# Patient Record
Sex: FEMALE | Race: WHITE | Employment: FULL TIME | URBAN - NONMETROPOLITAN AREA
[De-identification: names, ages, dates, MRNs, and addresses within clinical notes are randomized per-mention and may not be internally consistent; named-entity substitution may affect disease eponyms.]

---

## 2022-12-18 ENCOUNTER — HOSPITAL ENCOUNTER (INPATIENT)
Age: 22
LOS: 3 days | Discharge: HOME | DRG: 343 | End: 2022-12-21
Attending: FAMILY MEDICINE | Admitting: FAMILY MEDICINE
Payer: COMMERCIAL

## 2022-12-18 ENCOUNTER — APPOINTMENT (OUTPATIENT)
Dept: CT IMAGING | Age: 22
DRG: 343 | End: 2022-12-18
Payer: COMMERCIAL

## 2022-12-18 DIAGNOSIS — K35.20 ACUTE APPENDICITIS WITH GENERALIZED PERITONITIS, UNSPECIFIED WHETHER ABSCESS PRESENT, UNSPECIFIED WHETHER GANGRENE PRESENT, UNSPECIFIED WHETHER PERFORATION PRESENT: Primary | ICD-10-CM

## 2022-12-18 DIAGNOSIS — K35.80 ACUTE APPENDICITIS, UNSPECIFIED ACUTE APPENDICITIS TYPE: ICD-10-CM

## 2022-12-18 LAB
ABSOLUTE EOS #: 0.18 K/UL (ref 0–0.44)
ABSOLUTE IMMATURE GRANULOCYTE: 0.18 K/UL (ref 0–0.3)
ABSOLUTE LYMPH #: 2.31 K/UL (ref 1.2–5.2)
ABSOLUTE MONO #: 1.6 K/UL (ref 0.1–1.4)
ALBUMIN SERPL-MCNC: 4.1 G/DL (ref 3.5–5.2)
ALBUMIN/GLOBULIN RATIO: 1.3 (ref 1–2.5)
ALP BLD-CCNC: 67 U/L (ref 35–104)
ALT SERPL-CCNC: 10 U/L (ref 5–33)
ANION GAP SERPL CALCULATED.3IONS-SCNC: 10 MMOL/L (ref 9–17)
AST SERPL-CCNC: 14 U/L
BASOPHILS # BLD: 0 % (ref 0–2)
BASOPHILS ABSOLUTE: 0 K/UL (ref 0–0.2)
BILIRUB SERPL-MCNC: <0.1 MG/DL (ref 0.3–1.2)
BILIRUBIN URINE: NEGATIVE
BILIRUBIN URINE: NEGATIVE
BUN BLDV-MCNC: 14 MG/DL (ref 6–20)
BUN/CREAT BLD: 20 (ref 9–20)
CALCIUM SERPL-MCNC: 9.3 MG/DL (ref 8.6–10.4)
CHLORIDE BLD-SCNC: 102 MMOL/L (ref 98–107)
CO2: 27 MMOL/L (ref 20–31)
COLOR: YELLOW
COLOR: YELLOW
CREAT SERPL-MCNC: 0.7 MG/DL (ref 0.5–0.9)
EOSINOPHILS RELATIVE PERCENT: 1 % (ref 1–4)
EPITHELIAL CELLS UA: ABNORMAL /HPF (ref 0–25)
EPITHELIAL CELLS UA: NORMAL /HPF (ref 0–25)
GFR SERPL CREATININE-BSD FRML MDRD: >60 ML/MIN/1.73M2
GLUCOSE BLD-MCNC: 91 MG/DL (ref 70–99)
GLUCOSE URINE: NEGATIVE
GLUCOSE URINE: NEGATIVE
HCG(URINE) PREGNANCY TEST: NEGATIVE
HCT VFR BLD CALC: 34.5 % (ref 36.3–47.1)
HEMOGLOBIN: 11.6 G/DL (ref 11.9–15.1)
IMMATURE GRANULOCYTES: 1 %
KETONES, URINE: NEGATIVE
KETONES, URINE: NEGATIVE
LEUKOCYTE ESTERASE, URINE: NEGATIVE
LEUKOCYTE ESTERASE, URINE: NEGATIVE
LIPASE: 15 U/L (ref 13–60)
LYMPHOCYTES # BLD: 13 % (ref 25–45)
MCH RBC QN AUTO: 32.9 PG (ref 25.2–33.5)
MCHC RBC AUTO-ENTMCNC: 33.6 G/DL (ref 28.4–34.8)
MCV RBC AUTO: 97.7 FL (ref 82.6–102.9)
MONOCYTES # BLD: 9 % (ref 2–8)
MORPHOLOGY: NORMAL
NITRITE, URINE: NEGATIVE
NITRITE, URINE: NEGATIVE
NRBC AUTOMATED: 0 PER 100 WBC
PDW BLD-RTO: 11.7 % (ref 11.8–14.4)
PH UA: 6.5 (ref 5–9)
PH UA: 6.5 (ref 5–9)
PLATELET # BLD: 268 K/UL (ref 138–453)
PMV BLD AUTO: 9.2 FL (ref 8.1–13.5)
POTASSIUM SERPL-SCNC: 4.2 MMOL/L (ref 3.7–5.3)
PROTEIN UA: ABNORMAL
PROTEIN UA: NEGATIVE
RBC # BLD: 3.53 M/UL (ref 3.95–5.11)
RBC UA: ABNORMAL /HPF (ref 0–2)
RBC UA: NORMAL /HPF (ref 0–2)
SEG NEUTROPHILS: 76 % (ref 34–64)
SEGMENTED NEUTROPHILS ABSOLUTE COUNT: 13.53 K/UL (ref 1.8–8)
SODIUM BLD-SCNC: 139 MMOL/L (ref 135–144)
SPECIFIC GRAVITY UA: >1.03 (ref 1.01–1.02)
SPECIFIC GRAVITY UA: >1.03 (ref 1.01–1.02)
TOTAL PROTEIN: 7.2 G/DL (ref 6.4–8.3)
TURBIDITY: CLEAR
TURBIDITY: CLEAR
URINE HGB: NEGATIVE
URINE HGB: NEGATIVE
UROBILINOGEN, URINE: NORMAL
UROBILINOGEN, URINE: NORMAL
WBC # BLD: 17.8 K/UL (ref 4.5–13.5)
WBC UA: ABNORMAL /HPF (ref 0–5)
WBC UA: NORMAL /HPF (ref 0–5)

## 2022-12-18 PROCEDURE — 2580000003 HC RX 258: Performed by: PHYSICIAN ASSISTANT

## 2022-12-18 PROCEDURE — 81001 URINALYSIS AUTO W/SCOPE: CPT

## 2022-12-18 PROCEDURE — 94761 N-INVAS EAR/PLS OXIMETRY MLT: CPT

## 2022-12-18 PROCEDURE — 1200000000 HC SEMI PRIVATE

## 2022-12-18 PROCEDURE — 36415 COLL VENOUS BLD VENIPUNCTURE: CPT

## 2022-12-18 PROCEDURE — 6360000004 HC RX CONTRAST MEDICATION: Performed by: PHYSICIAN ASSISTANT

## 2022-12-18 PROCEDURE — 80053 COMPREHEN METABOLIC PANEL: CPT

## 2022-12-18 PROCEDURE — 85025 COMPLETE CBC W/AUTO DIFF WBC: CPT

## 2022-12-18 PROCEDURE — 99285 EMERGENCY DEPT VISIT HI MDM: CPT

## 2022-12-18 PROCEDURE — 96365 THER/PROPH/DIAG IV INF INIT: CPT

## 2022-12-18 PROCEDURE — 2580000003 HC RX 258: Performed by: FAMILY MEDICINE

## 2022-12-18 PROCEDURE — 81025 URINE PREGNANCY TEST: CPT

## 2022-12-18 PROCEDURE — 83690 ASSAY OF LIPASE: CPT

## 2022-12-18 PROCEDURE — 74177 CT ABD & PELVIS W/CONTRAST: CPT

## 2022-12-18 PROCEDURE — 6360000002 HC RX W HCPCS: Performed by: PHYSICIAN ASSISTANT

## 2022-12-18 RX ORDER — POTASSIUM CHLORIDE 7.45 MG/ML
10 INJECTION INTRAVENOUS PRN
Status: DISCONTINUED | OUTPATIENT
Start: 2022-12-18 | End: 2022-12-21 | Stop reason: HOSPADM

## 2022-12-18 RX ORDER — SODIUM CHLORIDE 9 MG/ML
INJECTION, SOLUTION INTRAVENOUS PRN
Status: DISCONTINUED | OUTPATIENT
Start: 2022-12-18 | End: 2022-12-21 | Stop reason: HOSPADM

## 2022-12-18 RX ORDER — ONDANSETRON 2 MG/ML
4 INJECTION INTRAMUSCULAR; INTRAVENOUS EVERY 6 HOURS PRN
Status: DISCONTINUED | OUTPATIENT
Start: 2022-12-18 | End: 2022-12-21 | Stop reason: HOSPADM

## 2022-12-18 RX ORDER — ONDANSETRON 4 MG/1
4 TABLET, ORALLY DISINTEGRATING ORAL EVERY 8 HOURS PRN
Status: DISCONTINUED | OUTPATIENT
Start: 2022-12-18 | End: 2022-12-21 | Stop reason: HOSPADM

## 2022-12-18 RX ORDER — SODIUM CHLORIDE, SODIUM LACTATE, POTASSIUM CHLORIDE, CALCIUM CHLORIDE 600; 310; 30; 20 MG/100ML; MG/100ML; MG/100ML; MG/100ML
INJECTION, SOLUTION INTRAVENOUS CONTINUOUS
Status: DISCONTINUED | OUTPATIENT
Start: 2022-12-18 | End: 2022-12-19

## 2022-12-18 RX ORDER — SODIUM CHLORIDE 0.9 % (FLUSH) 0.9 %
5-40 SYRINGE (ML) INJECTION EVERY 12 HOURS SCHEDULED
Status: DISCONTINUED | OUTPATIENT
Start: 2022-12-18 | End: 2022-12-21 | Stop reason: HOSPADM

## 2022-12-18 RX ORDER — POTASSIUM CHLORIDE 20 MEQ/1
40 TABLET, EXTENDED RELEASE ORAL PRN
Status: DISCONTINUED | OUTPATIENT
Start: 2022-12-18 | End: 2022-12-21 | Stop reason: HOSPADM

## 2022-12-18 RX ORDER — MORPHINE SULFATE 2 MG/ML
2 INJECTION, SOLUTION INTRAMUSCULAR; INTRAVENOUS
Status: DISCONTINUED | OUTPATIENT
Start: 2022-12-18 | End: 2022-12-21 | Stop reason: HOSPADM

## 2022-12-18 RX ORDER — SODIUM CHLORIDE 0.9 % (FLUSH) 0.9 %
5-40 SYRINGE (ML) INJECTION PRN
Status: DISCONTINUED | OUTPATIENT
Start: 2022-12-18 | End: 2022-12-21 | Stop reason: HOSPADM

## 2022-12-18 RX ADMIN — SODIUM CHLORIDE, PRESERVATIVE FREE 10 ML: 5 INJECTION INTRAVENOUS at 23:32

## 2022-12-18 RX ADMIN — SODIUM CHLORIDE, POTASSIUM CHLORIDE, SODIUM LACTATE AND CALCIUM CHLORIDE: 600; 310; 30; 20 INJECTION, SOLUTION INTRAVENOUS at 23:28

## 2022-12-18 RX ADMIN — PIPERACILLIN AND TAZOBACTAM 3375 MG: 3; .375 INJECTION, POWDER, FOR SOLUTION INTRAVENOUS at 21:20

## 2022-12-18 RX ADMIN — IOPAMIDOL 75 ML: 755 INJECTION, SOLUTION INTRAVENOUS at 20:26

## 2022-12-18 ASSESSMENT — PAIN SCALES - GENERAL
PAINLEVEL_OUTOF10: 0
PAINLEVEL_OUTOF10: 1
PAINLEVEL_OUTOF10: 5

## 2022-12-18 ASSESSMENT — PAIN DESCRIPTION - ORIENTATION
ORIENTATION: RIGHT;LOWER
ORIENTATION: RIGHT;LOWER

## 2022-12-18 ASSESSMENT — LIFESTYLE VARIABLES
HOW MANY STANDARD DRINKS CONTAINING ALCOHOL DO YOU HAVE ON A TYPICAL DAY: 1 OR 2
HOW OFTEN DO YOU HAVE A DRINK CONTAINING ALCOHOL: NEVER

## 2022-12-18 ASSESSMENT — PAIN DESCRIPTION - LOCATION
LOCATION: ABDOMEN
LOCATION: ABDOMEN

## 2022-12-18 ASSESSMENT — PAIN - FUNCTIONAL ASSESSMENT: PAIN_FUNCTIONAL_ASSESSMENT: 0-10

## 2022-12-19 ENCOUNTER — ANESTHESIA EVENT (OUTPATIENT)
Dept: OPERATING ROOM | Age: 22
DRG: 343 | End: 2022-12-19
Payer: COMMERCIAL

## 2022-12-19 ENCOUNTER — ANESTHESIA (OUTPATIENT)
Dept: OPERATING ROOM | Age: 22
DRG: 343 | End: 2022-12-19
Payer: COMMERCIAL

## 2022-12-19 PROBLEM — E11.9 DIABETES (HCC): Status: ACTIVE | Noted: 2022-12-19

## 2022-12-19 PROBLEM — J45.909 ASTHMA: Status: ACTIVE | Noted: 2022-12-19

## 2022-12-19 LAB
ABSOLUTE EOS #: 0 K/UL (ref 0–0.44)
ABSOLUTE IMMATURE GRANULOCYTE: 0 K/UL (ref 0–0.3)
ABSOLUTE LYMPH #: 2.32 K/UL (ref 1.2–5.2)
ABSOLUTE MONO #: 2.32 K/UL (ref 0.1–1.4)
ANION GAP SERPL CALCULATED.3IONS-SCNC: 9 MMOL/L (ref 9–17)
BASOPHILS # BLD: 0 % (ref 0–2)
BASOPHILS ABSOLUTE: 0 K/UL (ref 0–0.2)
BUN BLDV-MCNC: 12 MG/DL (ref 6–20)
BUN/CREAT BLD: 14 (ref 9–20)
CALCIUM SERPL-MCNC: 9.8 MG/DL (ref 8.6–10.4)
CHLORIDE BLD-SCNC: 101 MMOL/L (ref 98–107)
CO2: 26 MMOL/L (ref 20–31)
CREAT SERPL-MCNC: 0.84 MG/DL (ref 0.5–0.9)
EOSINOPHILS RELATIVE PERCENT: 0 % (ref 1–4)
GFR SERPL CREATININE-BSD FRML MDRD: >60 ML/MIN/1.73M2
GLUCOSE BLD-MCNC: 92 MG/DL (ref 70–99)
HCT VFR BLD CALC: 33.9 % (ref 36.3–47.1)
HEMOGLOBIN: 11.4 G/DL (ref 11.9–15.1)
IMMATURE GRANULOCYTES: 0 %
LYMPHOCYTES # BLD: 12 % (ref 25–45)
MCH RBC QN AUTO: 32.3 PG (ref 25.2–33.5)
MCHC RBC AUTO-ENTMCNC: 33.6 G/DL (ref 28.4–34.8)
MCV RBC AUTO: 96 FL (ref 82.6–102.9)
MONOCYTES # BLD: 12 % (ref 2–8)
MORPHOLOGY: NORMAL
NRBC AUTOMATED: 0 PER 100 WBC
PDW BLD-RTO: 11.7 % (ref 11.8–14.4)
PLATELET # BLD: 257 K/UL (ref 138–453)
PMV BLD AUTO: 9.2 FL (ref 8.1–13.5)
POTASSIUM SERPL-SCNC: 4.6 MMOL/L (ref 3.7–5.3)
RBC # BLD: 3.53 M/UL (ref 3.95–5.11)
SEG NEUTROPHILS: 76 % (ref 34–64)
SEGMENTED NEUTROPHILS ABSOLUTE COUNT: 14.66 K/UL (ref 1.8–8)
SODIUM BLD-SCNC: 136 MMOL/L (ref 135–144)
WBC # BLD: 19.3 K/UL (ref 4.5–13.5)

## 2022-12-19 PROCEDURE — 2580000003 HC RX 258: Performed by: SURGERY

## 2022-12-19 PROCEDURE — 2500000003 HC RX 250 WO HCPCS: Performed by: NURSE ANESTHETIST, CERTIFIED REGISTERED

## 2022-12-19 PROCEDURE — 85025 COMPLETE CBC W/AUTO DIFF WBC: CPT

## 2022-12-19 PROCEDURE — 1200000000 HC SEMI PRIVATE

## 2022-12-19 PROCEDURE — 0WJG4ZZ INSPECTION OF PERITONEAL CAVITY, PERCUTANEOUS ENDOSCOPIC APPROACH: ICD-10-PCS | Performed by: SURGERY

## 2022-12-19 PROCEDURE — A4216 STERILE WATER/SALINE, 10 ML: HCPCS | Performed by: NURSE ANESTHETIST, CERTIFIED REGISTERED

## 2022-12-19 PROCEDURE — 64488 TAP BLOCK BI INJECTION: CPT | Performed by: NURSE ANESTHETIST, CERTIFIED REGISTERED

## 2022-12-19 PROCEDURE — 88304 TISSUE EXAM BY PATHOLOGIST: CPT

## 2022-12-19 PROCEDURE — 2580000003 HC RX 258: Performed by: NURSE ANESTHETIST, CERTIFIED REGISTERED

## 2022-12-19 PROCEDURE — 2720000010 HC SURG SUPPLY STERILE: Performed by: SURGERY

## 2022-12-19 PROCEDURE — 6370000000 HC RX 637 (ALT 250 FOR IP): Performed by: SURGERY

## 2022-12-19 PROCEDURE — 7100000001 HC PACU RECOVERY - ADDTL 15 MIN: Performed by: SURGERY

## 2022-12-19 PROCEDURE — 94761 N-INVAS EAR/PLS OXIMETRY MLT: CPT

## 2022-12-19 PROCEDURE — 7100000000 HC PACU RECOVERY - FIRST 15 MIN: Performed by: SURGERY

## 2022-12-19 PROCEDURE — 3700000000 HC ANESTHESIA ATTENDED CARE: Performed by: SURGERY

## 2022-12-19 PROCEDURE — 3600000004 HC SURGERY LEVEL 4 BASE: Performed by: SURGERY

## 2022-12-19 PROCEDURE — 80048 BASIC METABOLIC PNL TOTAL CA: CPT

## 2022-12-19 PROCEDURE — 6360000002 HC RX W HCPCS: Performed by: FAMILY MEDICINE

## 2022-12-19 PROCEDURE — 2580000003 HC RX 258: Performed by: FAMILY MEDICINE

## 2022-12-19 PROCEDURE — 6360000002 HC RX W HCPCS: Performed by: SURGERY

## 2022-12-19 PROCEDURE — 36415 COLL VENOUS BLD VENIPUNCTURE: CPT

## 2022-12-19 PROCEDURE — 2709999900 HC NON-CHARGEABLE SUPPLY: Performed by: SURGERY

## 2022-12-19 PROCEDURE — 6360000002 HC RX W HCPCS: Performed by: NURSE ANESTHETIST, CERTIFIED REGISTERED

## 2022-12-19 PROCEDURE — 0DTJ0ZZ RESECTION OF APPENDIX, OPEN APPROACH: ICD-10-PCS | Performed by: SURGERY

## 2022-12-19 PROCEDURE — 3700000001 HC ADD 15 MINUTES (ANESTHESIA): Performed by: SURGERY

## 2022-12-19 PROCEDURE — 3600000014 HC SURGERY LEVEL 4 ADDTL 15MIN: Performed by: SURGERY

## 2022-12-19 RX ORDER — DIPHENHYDRAMINE HYDROCHLORIDE 50 MG/ML
INJECTION INTRAMUSCULAR; INTRAVENOUS PRN
Status: DISCONTINUED | OUTPATIENT
Start: 2022-12-19 | End: 2022-12-19 | Stop reason: SDUPTHER

## 2022-12-19 RX ORDER — NEOSTIGMINE METHYLSULFATE 1 MG/ML
INJECTION, SOLUTION INTRAVENOUS PRN
Status: DISCONTINUED | OUTPATIENT
Start: 2022-12-19 | End: 2022-12-19 | Stop reason: SDUPTHER

## 2022-12-19 RX ORDER — SODIUM CHLORIDE 0.9 % (FLUSH) 0.9 %
5-40 SYRINGE (ML) INJECTION EVERY 12 HOURS SCHEDULED
Status: DISCONTINUED | OUTPATIENT
Start: 2022-12-19 | End: 2022-12-19 | Stop reason: HOSPADM

## 2022-12-19 RX ORDER — SODIUM CHLORIDE, SODIUM LACTATE, POTASSIUM CHLORIDE, CALCIUM CHLORIDE 600; 310; 30; 20 MG/100ML; MG/100ML; MG/100ML; MG/100ML
INJECTION, SOLUTION INTRAVENOUS CONTINUOUS
Status: DISCONTINUED | OUTPATIENT
Start: 2022-12-19 | End: 2022-12-21 | Stop reason: HOSPADM

## 2022-12-19 RX ORDER — KETAMINE HCL 50MG/ML(1)
SYRINGE (ML) INTRAVENOUS PRN
Status: DISCONTINUED | OUTPATIENT
Start: 2022-12-19 | End: 2022-12-19 | Stop reason: SDUPTHER

## 2022-12-19 RX ORDER — GLYCOPYRROLATE 0.2 MG/ML
INJECTION INTRAMUSCULAR; INTRAVENOUS PRN
Status: DISCONTINUED | OUTPATIENT
Start: 2022-12-19 | End: 2022-12-19 | Stop reason: SDUPTHER

## 2022-12-19 RX ORDER — FENTANYL CITRATE 50 UG/ML
25 INJECTION, SOLUTION INTRAMUSCULAR; INTRAVENOUS EVERY 5 MIN PRN
Status: COMPLETED | OUTPATIENT
Start: 2022-12-19 | End: 2022-12-19

## 2022-12-19 RX ORDER — SODIUM CHLORIDE 9 MG/ML
INJECTION INTRAVENOUS PRN
Status: DISCONTINUED | OUTPATIENT
Start: 2022-12-19 | End: 2022-12-19 | Stop reason: SDUPTHER

## 2022-12-19 RX ORDER — DEXAMETHASONE SODIUM PHOSPHATE 4 MG/ML
INJECTION, SOLUTION INTRA-ARTICULAR; INTRALESIONAL; INTRAMUSCULAR; INTRAVENOUS; SOFT TISSUE PRN
Status: DISCONTINUED | OUTPATIENT
Start: 2022-12-19 | End: 2022-12-19 | Stop reason: SDUPTHER

## 2022-12-19 RX ORDER — SODIUM CHLORIDE, SODIUM LACTATE, POTASSIUM CHLORIDE, CALCIUM CHLORIDE 600; 310; 30; 20 MG/100ML; MG/100ML; MG/100ML; MG/100ML
INJECTION, SOLUTION INTRAVENOUS CONTINUOUS PRN
Status: DISCONTINUED | OUTPATIENT
Start: 2022-12-19 | End: 2022-12-19 | Stop reason: SDUPTHER

## 2022-12-19 RX ORDER — ROCURONIUM BROMIDE 10 MG/ML
INJECTION, SOLUTION INTRAVENOUS PRN
Status: DISCONTINUED | OUTPATIENT
Start: 2022-12-19 | End: 2022-12-19 | Stop reason: SDUPTHER

## 2022-12-19 RX ORDER — DEXAMETHASONE SODIUM PHOSPHATE 10 MG/ML
INJECTION, SOLUTION INTRAMUSCULAR; INTRAVENOUS PRN
Status: DISCONTINUED | OUTPATIENT
Start: 2022-12-19 | End: 2022-12-19 | Stop reason: SDUPTHER

## 2022-12-19 RX ORDER — FENTANYL CITRATE 50 UG/ML
50 INJECTION, SOLUTION INTRAMUSCULAR; INTRAVENOUS EVERY 5 MIN PRN
Status: DISCONTINUED | OUTPATIENT
Start: 2022-12-19 | End: 2022-12-19 | Stop reason: HOSPADM

## 2022-12-19 RX ORDER — SODIUM CHLORIDE 0.9 % (FLUSH) 0.9 %
5-40 SYRINGE (ML) INJECTION PRN
Status: DISCONTINUED | OUTPATIENT
Start: 2022-12-19 | End: 2022-12-19 | Stop reason: HOSPADM

## 2022-12-19 RX ORDER — METOCLOPRAMIDE HYDROCHLORIDE 5 MG/ML
10 INJECTION INTRAMUSCULAR; INTRAVENOUS
Status: DISCONTINUED | OUTPATIENT
Start: 2022-12-19 | End: 2022-12-19 | Stop reason: HOSPADM

## 2022-12-19 RX ORDER — ONDANSETRON 2 MG/ML
INJECTION INTRAMUSCULAR; INTRAVENOUS PRN
Status: DISCONTINUED | OUTPATIENT
Start: 2022-12-19 | End: 2022-12-19 | Stop reason: SDUPTHER

## 2022-12-19 RX ORDER — PROCHLORPERAZINE EDISYLATE 5 MG/ML
5 INJECTION INTRAMUSCULAR; INTRAVENOUS
Status: DISCONTINUED | OUTPATIENT
Start: 2022-12-19 | End: 2022-12-19 | Stop reason: HOSPADM

## 2022-12-19 RX ORDER — FENTANYL CITRATE 50 UG/ML
INJECTION, SOLUTION INTRAMUSCULAR; INTRAVENOUS PRN
Status: DISCONTINUED | OUTPATIENT
Start: 2022-12-19 | End: 2022-12-19 | Stop reason: SDUPTHER

## 2022-12-19 RX ORDER — LIDOCAINE HYDROCHLORIDE 20 MG/ML
INJECTION, SOLUTION EPIDURAL; INFILTRATION; INTRACAUDAL; PERINEURAL PRN
Status: DISCONTINUED | OUTPATIENT
Start: 2022-12-19 | End: 2022-12-19 | Stop reason: SDUPTHER

## 2022-12-19 RX ORDER — ROPIVACAINE HYDROCHLORIDE 5 MG/ML
INJECTION, SOLUTION EPIDURAL; INFILTRATION; PERINEURAL PRN
Status: DISCONTINUED | OUTPATIENT
Start: 2022-12-19 | End: 2022-12-19 | Stop reason: SDUPTHER

## 2022-12-19 RX ORDER — OXYCODONE HYDROCHLORIDE AND ACETAMINOPHEN 5; 325 MG/1; MG/1
1 TABLET ORAL EVERY 4 HOURS PRN
Status: DISCONTINUED | OUTPATIENT
Start: 2022-12-19 | End: 2022-12-21 | Stop reason: HOSPADM

## 2022-12-19 RX ORDER — SODIUM CHLORIDE 9 MG/ML
INJECTION, SOLUTION INTRAVENOUS PRN
Status: DISCONTINUED | OUTPATIENT
Start: 2022-12-19 | End: 2022-12-19 | Stop reason: HOSPADM

## 2022-12-19 RX ORDER — PROPOFOL 10 MG/ML
INJECTION, EMULSION INTRAVENOUS PRN
Status: DISCONTINUED | OUTPATIENT
Start: 2022-12-19 | End: 2022-12-19 | Stop reason: SDUPTHER

## 2022-12-19 RX ORDER — KETOROLAC TROMETHAMINE 30 MG/ML
INJECTION, SOLUTION INTRAMUSCULAR; INTRAVENOUS PRN
Status: DISCONTINUED | OUTPATIENT
Start: 2022-12-19 | End: 2022-12-19 | Stop reason: SDUPTHER

## 2022-12-19 RX ORDER — MIDAZOLAM HYDROCHLORIDE 1 MG/ML
INJECTION INTRAMUSCULAR; INTRAVENOUS PRN
Status: DISCONTINUED | OUTPATIENT
Start: 2022-12-19 | End: 2022-12-19 | Stop reason: SDUPTHER

## 2022-12-19 RX ADMIN — GLYCOPYRROLATE 0.6 MG: 0.2 INJECTION INTRAMUSCULAR; INTRAVENOUS at 15:18

## 2022-12-19 RX ADMIN — SODIUM CHLORIDE 20 ML: 9 INJECTION INTRAMUSCULAR; INTRAVENOUS; SUBCUTANEOUS at 13:59

## 2022-12-19 RX ADMIN — FENTANYL CITRATE 50 MCG: 50 INJECTION INTRAMUSCULAR; INTRAVENOUS at 13:53

## 2022-12-19 RX ADMIN — SODIUM CHLORIDE, POTASSIUM CHLORIDE, SODIUM LACTATE AND CALCIUM CHLORIDE: 600; 310; 30; 20 INJECTION, SOLUTION INTRAVENOUS at 17:59

## 2022-12-19 RX ADMIN — FENTANYL CITRATE 50 MCG: 50 INJECTION INTRAMUSCULAR; INTRAVENOUS at 13:48

## 2022-12-19 RX ADMIN — KETOROLAC TROMETHAMINE 30 MG: 30 INJECTION, SOLUTION INTRAMUSCULAR; INTRAVENOUS at 15:17

## 2022-12-19 RX ADMIN — FENTANYL CITRATE 50 MCG: 50 INJECTION, SOLUTION INTRAMUSCULAR; INTRAVENOUS at 16:29

## 2022-12-19 RX ADMIN — GLYCOPYRROLATE 0.2 MG: 0.2 INJECTION INTRAMUSCULAR; INTRAVENOUS at 16:10

## 2022-12-19 RX ADMIN — OXYCODONE HYDROCHLORIDE AND ACETAMINOPHEN 1 TABLET: 5; 325 TABLET ORAL at 17:15

## 2022-12-19 RX ADMIN — LIDOCAINE HYDROCHLORIDE 5 ML: 20 INJECTION, SOLUTION EPIDURAL; INFILTRATION; INTRACAUDAL; PERINEURAL at 13:54

## 2022-12-19 RX ADMIN — FENTANYL CITRATE 25 MCG: 50 INJECTION, SOLUTION INTRAMUSCULAR; INTRAVENOUS at 16:16

## 2022-12-19 RX ADMIN — PIPERACILLIN AND TAZOBACTAM 3375 MG: 3; .375 INJECTION, POWDER, FOR SOLUTION INTRAVENOUS at 10:52

## 2022-12-19 RX ADMIN — DIPHENHYDRAMINE HYDROCHLORIDE 12.5 MG: 50 INJECTION, SOLUTION INTRAMUSCULAR; INTRAVENOUS at 14:34

## 2022-12-19 RX ADMIN — SODIUM CHLORIDE, POTASSIUM CHLORIDE, SODIUM LACTATE AND CALCIUM CHLORIDE: 600; 310; 30; 20 INJECTION, SOLUTION INTRAVENOUS at 14:37

## 2022-12-19 RX ADMIN — PIPERACILLIN AND TAZOBACTAM 3375 MG: 3; .375 INJECTION, POWDER, FOR SOLUTION INTRAVENOUS at 03:36

## 2022-12-19 RX ADMIN — ONDANSETRON 4 MG: 2 INJECTION INTRAMUSCULAR; INTRAVENOUS at 14:02

## 2022-12-19 RX ADMIN — FENTANYL CITRATE 25 MCG: 50 INJECTION, SOLUTION INTRAMUSCULAR; INTRAVENOUS at 16:09

## 2022-12-19 RX ADMIN — PROPOFOL 50 MG: 10 INJECTION, EMULSION INTRAVENOUS at 14:31

## 2022-12-19 RX ADMIN — SODIUM CHLORIDE, POTASSIUM CHLORIDE, SODIUM LACTATE AND CALCIUM CHLORIDE: 600; 310; 30; 20 INJECTION, SOLUTION INTRAVENOUS at 13:31

## 2022-12-19 RX ADMIN — Medication 25 MG: at 14:52

## 2022-12-19 RX ADMIN — CEFAZOLIN 2 G: 10 INJECTION, POWDER, FOR SOLUTION INTRAVENOUS at 14:28

## 2022-12-19 RX ADMIN — ROCURONIUM BROMIDE 20 MG: 10 INJECTION, SOLUTION INTRAVENOUS at 14:31

## 2022-12-19 RX ADMIN — MIDAZOLAM 2 MG: 1 INJECTION INTRAMUSCULAR; INTRAVENOUS at 13:46

## 2022-12-19 RX ADMIN — MORPHINE SULFATE 2 MG: 2 INJECTION, SOLUTION INTRAMUSCULAR; INTRAVENOUS at 18:58

## 2022-12-19 RX ADMIN — DEXAMETHASONE SODIUM PHOSPHATE 4 MG: 4 INJECTION, SOLUTION INTRAMUSCULAR; INTRAVENOUS at 14:02

## 2022-12-19 RX ADMIN — ROPIVACAINE HYDROCHLORIDE 30 ML: 5 INJECTION EPIDURAL; INFILTRATION; PERINEURAL at 13:59

## 2022-12-19 RX ADMIN — PROPOFOL 150 MG: 10 INJECTION, EMULSION INTRAVENOUS at 13:54

## 2022-12-19 RX ADMIN — PIPERACILLIN AND TAZOBACTAM 3375 MG: 3; .375 INJECTION, POWDER, FOR SOLUTION INTRAVENOUS at 20:02

## 2022-12-19 RX ADMIN — NEOSTIGMINE METHYLSULFATE 3 MG: 1 INJECTION, SOLUTION INTRAVENOUS at 15:18

## 2022-12-19 RX ADMIN — ROCURONIUM BROMIDE 30 MG: 10 INJECTION, SOLUTION INTRAVENOUS at 13:54

## 2022-12-19 RX ADMIN — Medication 25 MG: at 15:11

## 2022-12-19 RX ADMIN — ONDANSETRON 4 MG: 2 INJECTION INTRAMUSCULAR; INTRAVENOUS at 17:55

## 2022-12-19 RX ADMIN — DEXAMETHASONE SODIUM PHOSPHATE 10 MG: 10 INJECTION, SOLUTION INTRAMUSCULAR; INTRAVENOUS at 13:59

## 2022-12-19 RX ADMIN — ONDANSETRON 4 MG: 2 INJECTION INTRAMUSCULAR; INTRAVENOUS at 15:17

## 2022-12-19 ASSESSMENT — PAIN - FUNCTIONAL ASSESSMENT
PAIN_FUNCTIONAL_ASSESSMENT: ACTIVITIES ARE NOT PREVENTED
PAIN_FUNCTIONAL_ASSESSMENT: 0-10
PAIN_FUNCTIONAL_ASSESSMENT: PREVENTS OR INTERFERES SOME ACTIVE ACTIVITIES AND ADLS

## 2022-12-19 ASSESSMENT — PAIN DESCRIPTION - ORIENTATION
ORIENTATION: RIGHT;LEFT
ORIENTATION: RIGHT;LOWER
ORIENTATION: RIGHT;LEFT
ORIENTATION: RIGHT;LOWER
ORIENTATION: RIGHT;LEFT

## 2022-12-19 ASSESSMENT — PAIN DESCRIPTION - LOCATION
LOCATION: ABDOMEN

## 2022-12-19 ASSESSMENT — PAIN SCALES - GENERAL
PAINLEVEL_OUTOF10: 7
PAINLEVEL_OUTOF10: 5
PAINLEVEL_OUTOF10: 7
PAINLEVEL_OUTOF10: 7
PAINLEVEL_OUTOF10: 9
PAINLEVEL_OUTOF10: 5
PAINLEVEL_OUTOF10: 7
PAINLEVEL_OUTOF10: 7
PAINLEVEL_OUTOF10: 4
PAINLEVEL_OUTOF10: 4

## 2022-12-19 ASSESSMENT — PAIN DESCRIPTION - PAIN TYPE
TYPE: SURGICAL PAIN

## 2022-12-19 ASSESSMENT — PAIN DESCRIPTION - DESCRIPTORS
DESCRIPTORS: ACHING;DISCOMFORT

## 2022-12-19 ASSESSMENT — PAIN DESCRIPTION - FREQUENCY
FREQUENCY: INTERMITTENT

## 2022-12-19 ASSESSMENT — PAIN DESCRIPTION - ONSET
ONSET: ON-GOING

## 2022-12-19 NOTE — ANESTHESIA POSTPROCEDURE EVALUATION
Department of Anesthesiology  Postprocedure Note    Patient: Nisreen Delgado  MRN: [id number]  YOB: 2000  Date of evaluation: 12/19/2022      Procedure Summary     Date: 12/19/22 Room / Location: [address] 04 / [facility]    Anesthesia Start: 8158 Anesthesia Stop: 1948    Procedure: APPENDECTOMY LAPAROSCOPIC - CONVERTED TO OPEN Diagnosis:       Acute appendicitis, unspecified acute appendicitis type      (acute appy)    Surgeons: Mari Catalan MD Responsible Provider: MARK Bailey CRNA    Anesthesia Type: general ASA Status: 1          Anesthesia Type: No value filed.     Jacob Phase I: Jacob Score: 9    Jacob Phase II:        Anesthesia Post Evaluation    Patient location during evaluation: PACU  Patient participation: complete - patient participated  Level of consciousness: awake and alert  Pain score: 4  Airway patency: patent  Nausea & Vomiting: no nausea and no vomiting  Complications: no  Cardiovascular status: blood pressure returned to baseline  Respiratory status: acceptable and room air  Hydration status: stable  Multimodal analgesia pain management approach

## 2022-12-19 NOTE — PROGRESS NOTES
Patient was admitted to the floor and report was taken. Vitals, assessment, and navigator are complete. Writer walked patient through the doctor's orders and the medications that would be administered tonight. Medications given in ER were also reviewed. Writer encouraged patient to ask questions. Personal belongings are with patient and patient is aware that she may have to remove her piercings for surgery. Patient's sister will hang on to them while she's in surgery. Patient and family were oriented to the room and call light and is aware that the IV pole has to go with patient if she has to use the restroom. Patient knows to call out if she has any difficulty with it. Patient's sister will remain in room overnight. Call light and bedside table are within reach. Patient aware surgery will be some time in the afternoon and that she will have to be NPO. Writer explained how the patient's day would possibly go tomorrow and that a care team would be rounding some time in the morning. No further questions from patient for family.

## 2022-12-19 NOTE — ANESTHESIA PROCEDURE NOTES
Peripheral Block    Patient location during procedure: pre-op  Reason for block: post-op pain management and at surgeon's request  Start time: 12/19/2022 1:53 PM  End time: 12/19/2022 1:58 PM  Staffing  Performed: resident/CRNA   Resident/CRNA: MARK Aguero CRNA  Preanesthetic Checklist  Completed: patient identified, IV checked, site marked, risks and benefits discussed, surgical/procedural consents, equipment checked, pre-op evaluation, timeout performed, anesthesia consent given, oxygen available, monitors applied/VS acknowledged and fire risk safety assessment completed and verbalized  Peripheral Block   Patient position: supine  Prep: ChloraPrep  Provider prep: mask and sterile gloves  Patient monitoring: continuous pulse ox, frequent blood pressure checks and IV access  Block type: TAP and Rectus sheath  Laterality: bilateral  Injection technique: single-shot  Guidance: ultrasound guided  Local infiltration: ropivacaine and decadron (15 ml ropivacaine left tap 7.5 ml ropivacaine 5 ml PFNS bilat RS decadron divieded even throughout)  Infiltration strength: 0.5 %  Local infiltration: ropivacaine and decadron (15 ml ropivacaine left tap 7.5 ml ropivacaine 5 ml PFNS bilat RS decadron divieded even throughout)  Dose: 30 mL    Needle   Needle type:  Other   Needle gauge: 22 G  Needle localization: anatomical landmarks and ultrasound guidanceOther needle type: [place]  Assessment   Injection assessment: negative aspiration for heme, no paresthesia on injection and no intravascular symptoms  Paresthesia pain: none  Slow fractionated injection: yes  Hemodynamics: stable  Real-time US image taken/store: yes  Outcomes: uncomplicated and patient tolerated procedure well

## 2022-12-19 NOTE — ANESTHESIA PRE PROCEDURE
Department of Anesthesiology  Preprocedure Note       Name:  Thad Green   Age:  23 y.o.  :  2000                                          MRN:  [id number]         Date:  2022      Surgeon: Francis Viveros):  Rios Mendenhall MD    Procedure: Procedure(s):  APPENDECTOMY LAPAROSCOPIC    Medications prior to admission:   Prior to Admission medications    Not on File       Current medications:    Current Facility-Administered Medications   Medication Dose Route Frequency Provider Last Rate Last Admin    [facility] Hold] lactated ringers infusion   IntraVENous Continuous Jaimie Verma  mL/hr at 22 2328 New Bag at 22 2328    [MAR Hold] sodium chloride flush 0.9 % injection 5-40 mL  5-40 mL IntraVENous 2 times per day Jaimie Verma MD   10 mL at 22 2332    [MAR Hold] sodium chloride flush 0.9 % injection 5-40 mL  5-40 mL IntraVENous PRN Jaimie Verma MD        [facility] Hold] 0.9 % sodium chloride infusion   IntraVENous PRN Jaimie Verma MD        [facility] Hold] potassium chloride (KLOR-CON M) extended release tablet 40 mEq  40 mEq Oral PRN Jaimie Verma MD        Or   [facility]] potassium bicarb-citric acid (EFFER-K) effervescent tablet 40 mEq  40 mEq Oral PRN Jaimie Verma MD        Or   [facility] Hold] potassium chloride 10 mEq/100 mL IVPB (Peripheral Line)  10 mEq IntraVENous PRN Jaimie Verma MD        [facility] Hold] ondansetron (ZOFRAN-ODT) disintegrating tablet 4 mg  4 mg Oral Q8H PRN Jaimie Verma MD        Or   [facility] Hold] ondansetron (ZOFRAN) injection 4 mg  4 mg IntraVENous Q6H PRN Jaimie Verma MD        [facility] Hold] piperacillin-tazobactam (ZOSYN) 3,375 mg in dextrose 5 % 50 mL IVPB extended infusion (mini-bag)  3,375 mg IntraVENous Q8H Jaimie Verma MD 12.5 mL/hr at 22 1052 3,375 mg at 22 1052    [MAR Hold] morphine (PF) injection 2 mg  2 mg IntraVENous Q2H PRN Jaimie Verma MD           Allergies:  No Known Allergies    Problem List:    Patient Active Problem List   Diagnosis Code    Acute appendicitis K35.80    Asthma J45.909    [facility] E11.9       Past Medical History:  History reviewed. No pertinent past medical history. Past Surgical History:  History reviewed. No pertinent surgical history.     Social History:    Social History     Tobacco Use    Smoking status: Never    Smokeless tobacco: Never   Substance Use Topics    Alcohol use: Yes     Comment: very rarely                                Counseling given: Not Answered      Vital Signs (Current):   Vitals:    12/19/22 0430 12/19/22 0700 12/19/22 1145 12/19/22 1247   BP:  110/60 103/64 (!) 112/59   Pulse:  75 86 79   Resp:  14 16 23   Temp:  36.9 °C (98.4 °F) 37.6 °C (99.7 °F) 37.3 °C (99.1 °F)   TempSrc:  Temporal Temporal Temporal   SpO2:  100% 100% 99%   Weight: 132 lb (59.9 kg)                                                 BP Readings from Last 3 Encounters:   12/19/22 (!) 112/59       NPO Status: Time of last liquid consumption: 2330                        Time of last solid consumption: 1300                        Date of last liquid consumption: 12/18/22                        Date of last solid food consumption: 12/18/22    BMI:   Wt Readings from Last 3 Encounters:   12/19/22 132 lb (59.9 kg)     There is no height or weight on file to calculate BMI.    CBC:   Lab Results   Component Value Date/Time    WBC 19.3 12/19/2022 06:10 AM    RBC 3.53 12/19/2022 06:10 AM    HGB 11.4 12/19/2022 06:10 AM    HCT 33.9 12/19/2022 06:10 AM    MCV 96.0 12/19/2022 06:10 AM    RDW 11.7 12/19/2022 06:10 AM     12/19/2022 06:10 AM       CMP:   Lab Results   Component Value Date/Time     12/19/2022 06:10 AM    K 4.6 12/19/2022 06:10 AM     12/19/2022 06:10 AM    CO2 26 12/19/2022 06:10 AM    BUN 12 12/19/2022 06:10 AM    CREATININE 0.84 12/19/2022 06:10 AM    LABGLOM >60 12/19/2022 06:10 AM    GLUCOSE 92 12/19/2022 06:10 AM    PROT 7.2 12/18/2022 08:25 PM    CALCIUM 9.8 12/19/2022 06:10 AM    BILITOT <0.1 12/18/2022 08:25 PM    ALKPHOS 67 12/18/2022 08:25 PM    AST 14 12/18/2022 08:25 PM    ALT 10 12/18/2022 08:25 PM       POC Tests: No results for input(s): POCGLU, POCNA, POCK, POCCL, POCBUN, POCHEMO, POCHCT in the last 72 hours. Coags: No results found for: PROTIME, INR, APTT    HCG (If Applicable):   Lab Results   Component Value Date    PREGTESTUR NEGATIVE 12/18/2022        ABGs: No results found for: PHART, PO2ART, RZE2SKA, DKJ2ZPF, BEART, Q4SYVVHX     Type & Screen (If Applicable):  No results found for: LABABO, LABRH    Drug/Infectious Status (If Applicable):  No results found for: HIV, HEPCAB    COVID-19 Screening (If Applicable): No results found for: COVID19        Anesthesia Evaluation  Patient summary reviewed and Nursing notes reviewed no history of anesthetic complications:   Airway: Mallampati: I  TM distance: >3 FB   Neck ROM: full  Mouth opening: > = 3 FB   Dental:          Pulmonary:Negative Pulmonary ROS and normal exam                               Cardiovascular:Negative CV ROS  Exercise tolerance: good (>4 METS),       (-) CAD, CABG/stent and dysrhythmias       Beta Blocker:  Not on Beta Blocker         Neuro/Psych:   Negative Neuro/Psych ROS              GI/Hepatic/Renal: Neg GI/Hepatic/Renal ROS            Endo/Other: Negative Endo/Other ROS                     ROS comment: PT DENIES dm Abdominal:             Vascular: negative vascular ROS. Other Findings:           Anesthesia Plan      general     ASA 1       Induction: intravenous. MIPS: Prophylactic antiemetics administered. Anesthetic plan and risks discussed with patient. Use of blood products discussed with patient whom consented to blood products. Plan discussed with CRNA.                     MARK Smith CRNA   12/19/2022

## 2022-12-19 NOTE — H&P
Subjective:      Patient ID: Wilver Valdez is a 23 y.o. female who presents today for:  Chief Complaint   Patient presents with    Abdominal Pain     Right lower quadrant pain for past 2 days     HPI  Kristina presented last evening with 24 to 48-hour history of lower abdominal pain. The pain has settled into the right lower quadrant. The pain is associated with nausea but no vomiting. The patient has had no fever. She denies any change in bowel habits. She denies any urinary symptoms. She denies previous history of similar type symptoms. History reviewed. No pertinent past medical history. History reviewed. No pertinent surgical history. No Known Allergies    No current facility-administered medications on file prior to encounter. No current outpatient medications on file prior to encounter. Objective:   Physical Examination:    BP (!) 112/59   Pulse 79   Temp 99.1 °F (37.3 °C) (Temporal)   Resp 23   Wt 132 lb (59.9 kg)   LMP 11/18/2022 (Approximate)   SpO2 99%     Physical Exam  HENT:      Head: Normocephalic and atraumatic. Eyes:      Extraocular Movements: Extraocular movements intact. Pupils: Pupils are equal, round, and reactive to light. Cardiovascular:      Rate and Rhythm: Normal rate and regular rhythm. Pulses: Normal pulses. Heart sounds: Normal heart sounds. No murmur heard. No friction rub. No gallop. Pulmonary:      Effort: Pulmonary effort is normal.      Breath sounds: Normal breath sounds. Abdominal:      General: Abdomen is flat. Bowel sounds are normal.      Palpations: Abdomen is soft. Tenderness: There is abdominal tenderness (RLQ without obvious mass). Musculoskeletal:         General: No swelling. Normal range of motion. Cervical back: Normal range of motion and neck supple. Neurological:      General: No focal deficit present. Mental Status: She is oriented to person, place, and time.    Psychiatric:         Mood and

## 2022-12-19 NOTE — ED PROVIDER NOTES
[facility], [facility] AT THE [facility] MED SURG  eMERGENCY dEPARTMENT eNCOUnter      Pt Name: Wilver Valdez  MRN: [id number]  [place] 2000  Date of evaluation: 12/18/22      CHIEF COMPLAINT:  Chief Complaint   Patient presents with    Abdominal Pain     Right lower quadrant pain for past 2 days       HISTORY OF PRESENT ILLNESS    Wilver Valdez is a 23 y.o. female who presents with right lower quadrant abdominal pain that is been going on for the past 2 days. She denies any fevers but states that she did feel chills at home last night. She denies any vomiting but states that she does feel nauseous. Denies any diarrhea or constipation. Denies any dysuria. States that the pain has localized in the right lower quadrant without any radiation. REVIEW OF SYSTEMS       Constitutional: Denies recent fever  Eyes: No visual changes. Neck: No neck pain. Respiratory: Denies recent shortness of breath. Cardiac:  Denies recent chest pain. GI: Right lower quadrant abdominal pain    Denies Blood in the stool or black tarry stools. : denies dysuria. Musculoskeletal: Denies focal weakness. Neurologic: denies headache or focal weakness. Skin:  Denies any rash. Negative in 10 essential Systems except as mentioned above and in the HPI. PAST MEDICAL HISTORY   PMH:  has no past medical history on file. Surgical History:  has a past surgical history that includes laparoscopic appendectomy (N/A, 12/19/2022). Social History:  reports that she has never smoked. She has never used smokeless tobacco. She reports current alcohol use. Family History: Noncontributory at this time  Psychiatric History: Noncontributory at this time    Allergies:has No Known Allergies.       PHYSICAL EXAM     INITIAL VITALS: /64   Pulse 63   Temp 98.4 °F (36.9 °C) (Temporal)   Resp 16   Ht 5' 7\" (1.702 m)   Wt 131 lb 9.8 oz (59.7 kg)   LMP 11/18/2022 (Approximate)   SpO2 98%   BMI 20.61 kg/m²   Constitutional:  Well developed Eyes:  Pupils equal and readily reactive to light  HENT:  Atraumatic, external ears normal, nose normal, oropharynx moist. Neck- supple   Respiratory:  Clear to auscultation bilaterally with good air exchange  Cardiovascular:  RRR with normal S1 and S2  Gastrointestinal/Abdomen: There is tenderness to palpation to McBurney's point, no peritoneal signs, bowel sounds active all quadrants  No pulsatile masses palpated. Musculoskeletal:  No edema, no tenderness, no deformities. Back:  No CVA tenderness. Normal to inspection. Integument:  No rash. Neurologic:  Alert & oriented x 3, no focal deficits noted       DIAGNOSTIC RESULTS       RADIOLOGY:   All plain film, CT, MRI, and formal ultrasound images (except ED bedside ultrasound) are read by the radiologist   CT ABDOMEN PELVIS W IV CONTRAST Additional Contrast? None   Final Result   Acute appendicitis with adjacent phlegmonous type inflammatory changes in the   right lower quadrant. There is no drainable fluid collection/abscess. Small volume of free fluid in the pelvis, likely reactive. Critical results were called by Dr. Iliana Stuart. Kisha Navarrete MD to Marielle Fast on   12/18/2022 at 21:05. LABS:  Labs Reviewed   URINALYSIS WITH MICROSCOPIC - Abnormal; Notable for the following components:       Result Value    Specific Gravity, UA >1.030 (*)     Protein, UA TRACE (*)     All other components within normal limits   CBC WITH AUTO DIFFERENTIAL - Abnormal; Notable for the following components:    WBC 17.8 (*)     RBC 3.53 (*)     Hemoglobin 11.6 (*)     Hematocrit 34.5 (*)     RDW 11.7 (*)     Seg Neutrophils 76 (*)     Lymphocytes 13 (*)     Monocytes 9 (*)     Immature Granulocytes 1 (*)     Segs Absolute 13.53 (*)     Absolute Mono # 1.60 (*)     All other components within normal limits   COMPREHENSIVE METABOLIC PANEL - Abnormal; Notable for the following components:     Total Bilirubin <0.1 (*)     All other components within normal limits   URINALYSIS WITH REFLEX TO CULTURE - Abnormal; Notable for the following components:    Specific Gravity, UA >1.030 (*)     All other components within normal limits   CBC WITH AUTO DIFFERENTIAL - Abnormal; Notable for the following components:    WBC 19.3 (*)     RBC 3.53 (*)     Hemoglobin 11.4 (*)     Hematocrit 33.9 (*)     RDW 11.7 (*)     Seg Neutrophils 76 (*)     Lymphocytes 12 (*)     Monocytes 12 (*)     Eosinophils % 0 (*)     Segs Absolute 14.66 (*)     Absolute Mono # 2.32 (*)     All other components within normal limits   CBC WITH AUTO DIFFERENTIAL - Abnormal; Notable for the following components:    WBC 24.2 (*)     RBC 3.35 (*)     Hemoglobin 11.0 (*)     Hematocrit 32.2 (*)     RDW 11.4 (*)     Seg Neutrophils 89 (*)     Lymphocytes 5 (*)     Eosinophils % 0 (*)     Segs Absolute 21.54 (*)     Absolute Mono # 1.45 (*)     All other components within normal limits   BASIC METABOLIC PANEL W/ REFLEX TO MG FOR LOW K - Abnormal; Notable for the following components:    Glucose 133 (*)     All other components within normal limits   CBC WITH AUTO DIFFERENTIAL - Abnormal; Notable for the following components:    RBC 3.12 (*)     Hemoglobin 10.3 (*)     Hematocrit 30.9 (*)     RDW 11.7 (*)     Seg Neutrophils 70 (*)     Lymphocytes 19 (*)     Monocytes 10 (*)     Segs Absolute 8.39 (*)     All other components within normal limits   BASIC METABOLIC PANEL W/ REFLEX TO MG FOR LOW K - Abnormal; Notable for the following components:    Anion Gap 7 (*)     All other components within normal limits   PREGNANCY, URINE   LIPASE   MICROSCOPIC URINALYSIS   BASIC METABOLIC PANEL W/ REFLEX TO MG FOR LOW K   SURGICAL PATHOLOGY REPORT   SURGICAL PATHOLOGY         EMERGENCY DEPARTMENT COURSE:   -------------------------  Signout dr. Sushila Triplett.  Ct and labs pending        Orders Placed This Encounter   Medications    iopamidol (ISOVUE-370) 76 % injection 75 mL    piperacillin-tazobactam (ZOSYN) 3,375 mg in dextrose 5 % 50 mL IVPB (mini-bag)     Order Specific Question:   Antimicrobial Indications     Answer:   Surgical Prophylaxis    DISCONTD: lactated ringers infusion    DISCONTD: sodium chloride flush 0.9 % injection 5-40 mL    DISCONTD: sodium chloride flush 0.9 % injection 5-40 mL    DISCONTD: 0.9 % sodium chloride infusion    DISCONTD: potassium chloride (KLOR-CON M) extended release tablet 40 mEq    DISCONTD: potassium bicarb-citric acid (EFFER-K) effervescent tablet 40 mEq    DISCONTD: potassium chloride 10 mEq/100 mL IVPB (Peripheral Line)    DISCONTD: ondansetron (ZOFRAN-ODT) disintegrating tablet 4 mg    DISCONTD: ondansetron (ZOFRAN) injection 4 mg    DISCONTD: piperacillin-tazobactam (ZOSYN) 3,375 mg in dextrose 5 % 50 mL IVPB extended infusion (mini-bag)     Order Specific Question:   Antimicrobial Indications     Answer:   Intra-Abdominal Infection    DISCONTD: morphine (PF) injection 2 mg    DISCONTD: sodium chloride flush 0.9 % injection 5-40 mL    DISCONTD: sodium chloride flush 0.9 % injection 5-40 mL    DISCONTD: 0.9 % sodium chloride infusion    fentaNYL (SUBLIMAZE) injection 25 mcg    DISCONTD: fentaNYL (SUBLIMAZE) injection 50 mcg    DISCONTD: metoclopramide (REGLAN) injection 10 mg    DISCONTD: prochlorperazine (COMPAZINE) injection 5 mg    DISCONTD: lactated ringers infusion    DISCONTD: oxyCODONE-acetaminophen (PERCOCET) 5-325 MG per tablet 1 tablet    DISCONTD: enoxaparin Sodium (LOVENOX) injection 30 mg     Order Specific Question:   Indication of Use     Answer:   Prophylaxis-DVT/PE    DISCONTD: enoxaparin (LOVENOX) injection 40 mg     Order Specific Question:   Indication of Use     Answer:   Prophylaxis-DVT/PE    oxyCODONE-acetaminophen (PERCOCET) 5-325 MG per tablet     Sig: Take 1 tablet by mouth every 4 hours as needed for Pain for up to 5 days.  Max Daily Amount: 6 tablets     Dispense:  20 tablet     Refill:  0     Reduce doses taken as pain becomes manageable    ondansetron (ZOFRAN) 4 MG tablet     Sig: Take 1 tablet by mouth 3 times daily as needed for Nausea or Vomiting     Dispense:  15 tablet     Refill:  0    amoxicillin-clavulanate (AUGMENTIN) 875-125 MG per tablet     Sig: Take 1 tablet by mouth 2 times daily for 10 days     Dispense:  20 tablet     Refill:  0       PATIENT REFERRED TO:  Kian Chandra MD  [address] [facility] [address]  [phone]    Go on 12/28/2022  follow up visit @ 9 AM    DISCHARGE MEDICATIONS:  Discharge Medication List as of 12/21/2022 12:40 PM        START taking these medications    Details   oxyCODONE-acetaminophen (PERCOCET) 5-325 MG per tablet Take 1 tablet by mouth every 4 hours as needed for Pain for up to 5 days.  Max Daily Amount: 6 tablets, Disp-20 tablet, R-0Print      ondansetron (ZOFRAN) 4 MG tablet Take 1 tablet by mouth 3 times daily as needed for Nausea or Vomiting, Disp-15 tablet, R-0Print      amoxicillin-clavulanate (AUGMENTIN) 875-125 MG per tablet Take 1 tablet by mouth 2 times daily for 10 days, Disp-20 tablet, R-0Print             (Please note that portions of this note were completed with a voice recognition program.  Efforts were made to edit the dictations but occasionally words are mis-transcribed.)    ERLIN Khan PA-C  12/18/22 2044       Jose Antonio Ruano PA-C  12/23/22 1121

## 2022-12-19 NOTE — OP NOTE
OPERATIVE NOTE    DATE OF PROCEDURE: 12/19/2022     SURGEON: Scout Siu MD     ASSISTANT: Leni Rascon CNP    PREOPERATIVE DIAGNOSIS: Acute appendicitis    POSTOPERATIVE DIAGNOSIS: Acute suppurative appendicitis    OPERATION: #1 attempted laparoscopic appendectomy #2 open appendectomy    ANESTHESIA: General    ESTIMATED BLOOD LOSS: 50 cc    COMPLICATIONS: None     SPECIMENS:   ID Type Source Tests Collected by Time Destination   A :  Tissue Appendix SURGICAL PATHOLOGY Scout Siu MD 12/19/2022 1505         HISTORY: The patient is a 23y.o. year old female with history of above preop diagnosis. I explained the risk, benefits, expected outcome, and alternatives to the procedure. Patient understands and is in agreement. PROCEDURE: The patient is brought to the operating room. She is placed in supine position. Inhalation anesthesia is induced. The abdomen is prepped draped standard fashion. Supraumbilical midline incision is made with a scalpel blade. Access is gained to the peritoneal cavity in standard fashion. Brandi Starcher port is placed and peritoneal insufflation is obtained. 2 5 mm ports were placed in standard locations in the left lower abdomen under direct vision. The patient is placed headdown right side up the appendix is located and grasped. The appendix is retrocecal.  I used a Kayy clamp to grasp the cecum and began to attempt to free up the appendix and delivered up into the anterior portion of the peritoneal cavity. An abscess cavity is drained as this is attempted. The appendix was quite adherent to the retroperitoneum. I was not having much success in mobilizing the appendix and the appendix was starting to rupture. I felt at that point that it was best to abort the laparoscopic approach. Air is released from the peritoneal cavity. All ports were removed. The fascia at the supraumbilical port site was reapproximated using interrupted 0 Vicryl suture.   McBurney incision is made with a #15 scalpel blade. Access is gained to the peritoneal cavity in standard fashion. The cecum and appendix were then delivered up into the wound. As the appendix was mobilized I did secure the appendiceal artery with a hemostat clamp and this was ligated using 3-0 Vicryl suture. There is a small rupture in the appendix near its tip but otherwise the appendix appears to be intact. A 3-0 silk pursestring suture is placed around the cecum at the base of the appendix. The appendix is doubly ligated using 3-0 Vicryl suture. The appendix is divided with a scalpel blade distal to these ligatures. The appendiceal stump was then done through the pursestring suture which is tied securely. The peritoneal cavity is vigorously flushed with saline which is then aspirated. Hemostasis is confirmed. The transversalis fascia is closed using running 3-0 Vicryl suture. The external bleak aponeurosis was closed using running 2-0 Vicryl suture. The skin incisions were closed using 4-0 Monocryl suture. Dressings are applied. The patient is awakened and returned to the recovery room. The patient was sent to PACU in good condition.      Electronically signed by Jorge Davis MD on 12/19/22 at 3:29 PM EST

## 2022-12-19 NOTE — PROGRESS NOTES
Pt vitals and assessment completed at this time, see flowsheet. Pt A&Ox4, breathing normal. Pt c/o pain 4/10 in RLQ. Pt denies any further needs at this time, call light within reach, will continue to monitor.

## 2022-12-19 NOTE — H&P
[facility]  History and Physical        Patient:  Carmelita Reddy  MRN: [id number]    Chief Complaint:    Chief Complaint   Patient presents with    Abdominal Pain     Right lower quadrant pain for past 2 days       History Obtained From:  patient, electronic medical record  PCP: No primary care provider on file. History of Present Illness: The patient is a 23 y.o. female who presents with abdominal pain associated with nausea ,starting 2 days ago. She came to er and has acute appendicitis. DR Florentino Bourgeois contacted and advised admission for surgery,iv antibiotics pending surgery. Past Medical History:    History reviewed. No pertinent past medical history. Past Surgical History:        Procedure Laterality Date    LAPAROSCOPIC APPENDECTOMY N/A 12/19/2022    APPENDECTOMY LAPAROSCOPIC - CONVERTED TO OPEN performed by Aura Villalobos MD at [address]       Medications Prior to Admission:    Prior to Admission medications    Not on File       Allergies:  Patient has no known allergies. Social History:   TOBACCO:   reports that she has never smoked. She has never used smokeless tobacco.  ETOH:   reports current alcohol use. Family History:       Problem Relation Age of Onset    No Known Problems Mother     No Known Problems Father        Review of Systems:  Constitutional:negative  for fevers, and negative for chills. Respiratory: negative for shortness of breath, negative for cough, and negative for wheezing  Cardiovascular: negative for chest pain, negative for palpitations, and negative for syncope  Gastrointestinal: positive for abdominal pain, positive for nausea,negative for vomiting, negative for diarrhea, negative for constipation, and negative for hematochezia or melena  Genitourinary: negative for dysuria, negative for urinary urgency, negative for urinary frequency, and negative for hematuria  Neurological: negative for unilateral weakness, numbness or tingling.     All other systems were reviewed with the patient and are negative except as stated    Objective:    Vitals:   Temp: 98.4 °F (36.9 °C)  BP: 104/64  Resp: 16  Heart Rate: 63  SpO2: 98 %  -----------------------------------------------------------------  Exam:  GEN:    Awake, alert and oriented x3. EYES:  EOMI, pupils equal   NECK: Supple. No lymphadenopathy. No carotid bruit  CVS:    regular rate and rhythm, no audible murmur  PULM:  CTA, no wheezes, rales or rhonchi, no acute respiratory distress  ABD:    Bowels sounds normal.  Abdomen is soft. No distention. RLQ tenderness to palpation. Has guarding,no rebound  EXT:   no edema bilaterally . No calf tenderness. NEURO: Moves all extremities. Motor and sensory are grossly intact  SKIN:  No rashes.   No skin lesions.    -----------------------------------------------------------------  Diagnostic Data:   All diagnostic data was reviewed  Lab Results   Component Value Date    WBC 12.0 12/21/2022    HGB 10.3 (L) 12/21/2022    MCV 99.0 12/21/2022     12/21/2022      Lab Results   Component Value Date    GLUCOSE 95 12/21/2022    BUN 15 12/21/2022    CREATININE 0.79 12/21/2022     12/21/2022    K 4.3 12/21/2022    CALCIUM 9.5 12/21/2022     12/21/2022    CO2 29 12/21/2022     Lab Results   Component Value Date    WBCUA 2 TO 5 12/18/2022    WBCUA None 12/18/2022    RBCUA None 12/18/2022    RBCUA None 12/18/2022    EPITHUA 5 TO 10 12/18/2022    EPITHUA 0 TO 2 12/18/2022    LEUKOCYTESUR NEGATIVE 12/18/2022    LEUKOCYTESUR NEGATIVE 12/18/2022    SPECGRAV >1.030 (H) 12/18/2022    SPECGRAV >1.030 (H) 12/18/2022    GLUCOSEU NEGATIVE 12/18/2022    GLUCOSEU NEGATIVE 12/18/2022    KETUA NEGATIVE 12/18/2022    KETUA NEGATIVE 12/18/2022    PROTEINU TRACE (A) 12/18/2022    PROTEINU NEGATIVE 12/18/2022    HGBUR NEGATIVE 12/18/2022    HGBUR NEGATIVE 12/18/2022       Assessment:     Principal Problem:    Acute appendicitis  Resolved Problems:    * No resolved hospital problems.  *      Plan:     Problem List       * (Principal) Acute appendicitis    Relevant Orders    Surgical Pathology        This patient requires inpatient admission because of acute appendicitis requiring iv antibiotics,surgical intervention  Factors affecting the medical complexity of this patient include none  Estimated length of stay is 2 days  Discussed patient's symptoms and data results including labs and imaging studies with the ER MD at time of admission  High risk drug monitoring: none      CORE MEASURES  DVT prophylaxis: SCD  Decubitus ulcer present on admission: No  CODE STATUS: FULL CODE  Nutrition Status: good   Physical therapy: Yes   Old Charts reviewed: Yes  EKG Reviewed: No  Advance Directive Addressed: Yes    Ama Grace MD , M.D.  12/21/2022  7:44 AM

## 2022-12-20 LAB
ABSOLUTE EOS #: 0 K/UL (ref 0–0.44)
ABSOLUTE IMMATURE GRANULOCYTE: 0 K/UL (ref 0–0.3)
ABSOLUTE LYMPH #: 1.21 K/UL (ref 1.2–5.2)
ABSOLUTE MONO #: 1.45 K/UL (ref 0.1–1.4)
ANION GAP SERPL CALCULATED.3IONS-SCNC: 10 MMOL/L (ref 9–17)
BASOPHILS # BLD: 0 % (ref 0–2)
BASOPHILS ABSOLUTE: 0 K/UL (ref 0–0.2)
BUN BLDV-MCNC: 16 MG/DL (ref 6–20)
BUN/CREAT BLD: 18 (ref 9–20)
CALCIUM SERPL-MCNC: 9.5 MG/DL (ref 8.6–10.4)
CHLORIDE BLD-SCNC: 100 MMOL/L (ref 98–107)
CO2: 25 MMOL/L (ref 20–31)
CREAT SERPL-MCNC: 0.9 MG/DL (ref 0.5–0.9)
EOSINOPHILS RELATIVE PERCENT: 0 % (ref 1–4)
GFR SERPL CREATININE-BSD FRML MDRD: >60 ML/MIN/1.73M2
GLUCOSE BLD-MCNC: 133 MG/DL (ref 70–99)
HCT VFR BLD CALC: 32.2 % (ref 36.3–47.1)
HEMOGLOBIN: 11 G/DL (ref 11.9–15.1)
IMMATURE GRANULOCYTES: 0 %
LYMPHOCYTES # BLD: 5 % (ref 25–45)
MCH RBC QN AUTO: 32.8 PG (ref 25.2–33.5)
MCHC RBC AUTO-ENTMCNC: 34.2 G/DL (ref 28.4–34.8)
MCV RBC AUTO: 96.1 FL (ref 82.6–102.9)
MONOCYTES # BLD: 6 % (ref 2–8)
MORPHOLOGY: NORMAL
NRBC AUTOMATED: 0 PER 100 WBC
PDW BLD-RTO: 11.4 % (ref 11.8–14.4)
PLATELET # BLD: 250 K/UL (ref 138–453)
PMV BLD AUTO: 9.6 FL (ref 8.1–13.5)
POTASSIUM SERPL-SCNC: 4.6 MMOL/L (ref 3.7–5.3)
RBC # BLD: 3.35 M/UL (ref 3.95–5.11)
SEG NEUTROPHILS: 89 % (ref 34–64)
SEGMENTED NEUTROPHILS ABSOLUTE COUNT: 21.54 K/UL (ref 1.8–8)
SODIUM BLD-SCNC: 135 MMOL/L (ref 135–144)
WBC # BLD: 24.2 K/UL (ref 4.5–13.5)

## 2022-12-20 PROCEDURE — 94761 N-INVAS EAR/PLS OXIMETRY MLT: CPT

## 2022-12-20 PROCEDURE — 80048 BASIC METABOLIC PNL TOTAL CA: CPT

## 2022-12-20 PROCEDURE — 2580000003 HC RX 258: Performed by: SURGERY

## 2022-12-20 PROCEDURE — 36415 COLL VENOUS BLD VENIPUNCTURE: CPT

## 2022-12-20 PROCEDURE — 6360000002 HC RX W HCPCS: Performed by: SURGERY

## 2022-12-20 PROCEDURE — 85025 COMPLETE CBC W/AUTO DIFF WBC: CPT

## 2022-12-20 PROCEDURE — 1200000000 HC SEMI PRIVATE

## 2022-12-20 RX ORDER — ENOXAPARIN SODIUM 100 MG/ML
30 INJECTION SUBCUTANEOUS DAILY
Status: DISCONTINUED | OUTPATIENT
Start: 2022-12-20 | End: 2022-12-21

## 2022-12-20 RX ADMIN — PIPERACILLIN AND TAZOBACTAM 3375 MG: 3; .375 INJECTION, POWDER, FOR SOLUTION INTRAVENOUS at 19:11

## 2022-12-20 RX ADMIN — MORPHINE SULFATE 2 MG: 2 INJECTION, SOLUTION INTRAMUSCULAR; INTRAVENOUS at 00:26

## 2022-12-20 RX ADMIN — MORPHINE SULFATE 2 MG: 2 INJECTION, SOLUTION INTRAMUSCULAR; INTRAVENOUS at 19:11

## 2022-12-20 RX ADMIN — SODIUM CHLORIDE, POTASSIUM CHLORIDE, SODIUM LACTATE AND CALCIUM CHLORIDE: 600; 310; 30; 20 INJECTION, SOLUTION INTRAVENOUS at 14:22

## 2022-12-20 RX ADMIN — MORPHINE SULFATE 2 MG: 2 INJECTION, SOLUTION INTRAMUSCULAR; INTRAVENOUS at 14:49

## 2022-12-20 RX ADMIN — PIPERACILLIN AND TAZOBACTAM 3375 MG: 3; .375 INJECTION, POWDER, FOR SOLUTION INTRAVENOUS at 10:54

## 2022-12-20 RX ADMIN — MORPHINE SULFATE 2 MG: 2 INJECTION, SOLUTION INTRAMUSCULAR; INTRAVENOUS at 23:53

## 2022-12-20 RX ADMIN — MORPHINE SULFATE 2 MG: 2 INJECTION, SOLUTION INTRAMUSCULAR; INTRAVENOUS at 11:45

## 2022-12-20 RX ADMIN — MORPHINE SULFATE 2 MG: 2 INJECTION, SOLUTION INTRAMUSCULAR; INTRAVENOUS at 07:27

## 2022-12-20 RX ADMIN — PIPERACILLIN AND TAZOBACTAM 3375 MG: 3; .375 INJECTION, POWDER, FOR SOLUTION INTRAVENOUS at 03:16

## 2022-12-20 ASSESSMENT — PAIN SCALES - GENERAL
PAINLEVEL_OUTOF10: 7
PAINLEVEL_OUTOF10: 7
PAINLEVEL_OUTOF10: 8
PAINLEVEL_OUTOF10: 5
PAINLEVEL_OUTOF10: 5
PAINLEVEL_OUTOF10: 9
PAINLEVEL_OUTOF10: 7

## 2022-12-20 ASSESSMENT — PAIN DESCRIPTION - DESCRIPTORS
DESCRIPTORS: ACHING;DISCOMFORT
DESCRIPTORS: ACHING
DESCRIPTORS: ACHING;DISCOMFORT
DESCRIPTORS: ACHING;CRAMPING
DESCRIPTORS: ACHING;DISCOMFORT

## 2022-12-20 ASSESSMENT — PAIN DESCRIPTION - ORIENTATION
ORIENTATION: RIGHT;LEFT
ORIENTATION: RIGHT;LOWER
ORIENTATION: RIGHT;LEFT
ORIENTATION: RIGHT;LEFT
ORIENTATION: RIGHT;LOWER

## 2022-12-20 ASSESSMENT — PAIN DESCRIPTION - LOCATION
LOCATION: ABDOMEN

## 2022-12-20 ASSESSMENT — PAIN - FUNCTIONAL ASSESSMENT
PAIN_FUNCTIONAL_ASSESSMENT: ACTIVITIES ARE NOT PREVENTED

## 2022-12-20 ASSESSMENT — PAIN DESCRIPTION - PAIN TYPE
TYPE: SURGICAL PAIN

## 2022-12-20 ASSESSMENT — PAIN DESCRIPTION - ONSET
ONSET: ON-GOING

## 2022-12-20 ASSESSMENT — PAIN DESCRIPTION - FREQUENCY
FREQUENCY: INTERMITTENT

## 2022-12-20 NOTE — PROGRESS NOTES
Pt awake resting in bed upon entering room. States pain is a 7, PRN medication given. VS and assessment as charted. Dr. Su Jennifer in to see pt and told her that her white count is elevated. Writer told pt that she should walk the halls to help with pain and to help get things moving as well, verbalized understanding. Call light in reach, will continue to monitor.

## 2022-12-20 NOTE — PROGRESS NOTES
Spoke to pt about lovenox injection, she does not want it at this time. States she will continue to walk in room and hallway. Currently having some pain but had just been up walking, states she will rest and see if it goes away. Will continue to monitor.

## 2022-12-20 NOTE — PLAN OF CARE
Problem: Discharge Planning  Goal: Discharge to home or other facility with appropriate resources  12/20/2022 0817 by Charley Ward RN  Outcome: Progressing     Problem: Pain  Goal: Verbalizes/displays adequate comfort level or baseline comfort level  12/20/2022 0817 by Charley Ward RN  Outcome: Progressing     Problem: Chronic Conditions and Co-morbidities  Goal: Patient's chronic conditions and co-morbidity symptoms are monitored and maintained or improved  12/20/2022 0817 by Charley Ward RN  Outcome: Progressing     Problem: ABCDS Injury Assessment  Goal: Absence of physical injury  Outcome: Progressing

## 2022-12-20 NOTE — PROGRESS NOTES
Progress Note    SUBJECTIVE:    Patient seen for f/u of Acute appendicitis. She resting in bed alert and no distress. No flatus today. ROS:   Constitutional: negative  for fevers, and negative for chills. Respiratory: negative for shortness of breath, negative for cough, and negative for wheezing  Cardiovascular: negative for chest pain, and negative for palpitations  Gastrointestinal: positive for abdominal pain, negative for nausea,negative for vomiting, negative for diarrhea, and negative for constipation     All other systems were reviewed with the patient and are negative unless otherwise stated in HPI      OBJECTIVE:      Vitals:   Vitals:    12/20/22 0757   BP:    Pulse:    Resp: 18   Temp:    SpO2:      Weight: 131 lb 9.8 oz (59.7 kg)   Height: 5' 7\" (170.2 cm)     Weight  Wt Readings from Last 3 Encounters:   12/20/22 131 lb 9.8 oz (59.7 kg)     Body mass index is 20.61 kg/m². 24HR INTAKE/OUTPUT:      Intake/Output Summary (Last 24 hours) at 12/20/2022 0949  Last data filed at 12/20/2022 0539  Gross per 24 hour   Intake 4013.46 ml   Output --   Net 4013.46 ml     -----------------------------------------------------------------  Exam:    GEN:    Awake, alert and oriented x3. EYES:  EOMI, pupils equal   NECK: Supple. No lymphadenopathy. No carotid bruit  CVS:    regular rate and rhythm, no audible murmur  PULM:  CTA, no wheezes, rales or rhonchi, no acute respiratory distress  ABD:    Bowels sounds hypoactive. Abdomen is soft. No distention. Surgical site  tenderness to palpation. EXT:   no edema bilaterally . No calf tenderness. NEURO: Moves all extremities. Motor and sensory are grossly intact  SKIN:  No rashes.   No skin lesions.    -----------------------------------------------------------------    Diagnostic Data:      Complete Blood Count:   Recent Labs     12/18/22 2025 12/19/22 0610 12/20/22 0610   WBC 17.8* 19.3* 24.2*   RBC 3.53* 3.53* 3.35*   HGB 11.6* 11.4* 11.0*   HCT 34.5* 33.9* 32.2*   MCV 97.7 96.0 96.1   MCH 32.9 32.3 32.8   MCHC 33.6 33.6 34.2   RDW 11.7* 11.7* 11.4*    257 250   MPV 9.2 9.2 9.6        Last 3 Blood Glucose:   Recent Labs     12/18/22 2025 12/19/22  0610   GLUCOSE 91 92        Comprehensive Metabolic Profile:   Recent Labs     12/18/22 2025 12/19/22  0610    136   K 4.2 4.6    101   CO2 27 26   BUN 14 12   CREATININE 0.70 0.84   GLUCOSE 91 92   CALCIUM 9.3 9.8   PROT 7.2  --    LABALBU 4.1  --    BILITOT <0.1*  --    ALKPHOS 67  --    AST 14  --    ALT 10  --         Urinalysis:   Lab Results   Component Value Date/Time    NITRU NEGATIVE 12/18/2022 07:40 PM    NITRU NEGATIVE 12/18/2022 07:40 PM    COLORU Yellow 12/18/2022 07:40 PM    COLORU Yellow 12/18/2022 07:40 PM    PHUR 6.5 12/18/2022 07:40 PM    PHUR 6.5 12/18/2022 07:40 PM    WBCUA 2 TO 5 12/18/2022 07:40 PM    WBCUA None 12/18/2022 07:40 PM    RBCUA None 12/18/2022 07:40 PM    RBCUA None 12/18/2022 07:40 PM    SPECGRAV >1.030 12/18/2022 07:40 PM    SPECGRAV >1.030 12/18/2022 07:40 PM    LEUKOCYTESUR NEGATIVE 12/18/2022 07:40 PM    LEUKOCYTESUR NEGATIVE 12/18/2022 07:40 PM    UROBILINOGEN Normal 12/18/2022 07:40 PM    UROBILINOGEN Normal 12/18/2022 07:40 PM    BILIRUBINUR NEGATIVE 12/18/2022 07:40 PM    BILIRUBINUR NEGATIVE 12/18/2022 07:40 PM    GLUCOSEU NEGATIVE 12/18/2022 07:40 PM    GLUCOSEU NEGATIVE 12/18/2022 07:40 PM    KETUA NEGATIVE 12/18/2022 07:40 PM    KETUA NEGATIVE 12/18/2022 07:40 PM       HgBA1c:  No results found for: LABA1C    Lactic Acid: No results found for: LACTA     Troponin: No results for input(s): TROPONINI in the last 72 hours. CRP:  No results for input(s): CRP in the last 72 hours. Radiology/Imaging:  CT ABDOMEN PELVIS W IV CONTRAST Additional Contrast? None   Final Result   Acute appendicitis with adjacent phlegmonous type inflammatory changes in the   right lower quadrant. There is no drainable fluid collection/abscess.       Small volume of free fluid in the pelvis, likely reactive. Critical results were called by Dr. Juan Manuel Bingham. Nia Puga MD to Jorge Allen on   12/18/2022 at 21:05. ASSESSMENT / PLAN:  Acute appendicitis  Continue current therapy   Appreciate Dr. Rodriguez Expose labs  Continue Zosyn  CL diet   Ambulate  Nutrition status:    Well developed, well nourished with no malnutrition  Dietician consult initiated  Hospital Prophylaxis:   DVT: Lovenox   Stress Ulcer:  none    High risk medications: none   Disposition:    Discharge plan is home      MARK Finch - CNP , APRN, NP-C  Hospitalist Medicine        12/20/2022, 9:49 AM

## 2022-12-20 NOTE — PROGRESS NOTES
Comprehensive Nutrition Assessment    Type and Reason for Visit:  Initial    Nutrition Recommendations/Plan:   Continue current diet. Progress to regular diet as tolerated. Malnutrition Assessment:  Malnutrition Status: At risk for malnutrition (Comment) (12/20/22 8344)    Context:  Acute Illness     Findings of the 6 clinical characteristics of malnutrition:  Energy Intake:  Mild decrease in energy intake (Comment)  Weight Loss:  No significant weight loss     Body Fat Loss:  No significant body fat loss     Muscle Mass Loss:  No significant muscle mass loss    Fluid Accumulation:  No significant fluid accumulation     Strength:  Not Performed    Nutrition Assessment:    increased nutrient needs r/t acute injury/trauma aeb open appendectomy surgical wound. Pt reports ~ 1/2 day of decreased PO. 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 She states she is 1.75 meters tall. Weight has been stable, tolerated clear liquids ok. Nutrition Related Findings:    appears well nourished Wound Type: Surgical Incision       Current Nutrition Intake & Therapies:    Average Meal Intake: 51-75%  Average Supplements Intake: None Ordered  ADULT DIET; Clear Liquid    Anthropometric Measures:  Height: 5' 7\" (170.2 cm)  Ideal Body Weight (IBW): 135 lbs (61 kg)    Admission Body Weight: 132 lb (59.9 kg)  Current Body Weight: 131 lb 9.8 oz (59.7 kg), 97.5 % IBW. Weight Source: Bed Scale  Current BMI (kg/m2): 20.6  Usual Body Weight: 132 lb (59.9 kg)  % Weight Change (Calculated): -0.3  Weight Adjustment For: No Adjustment                 BMI Categories: Normal Weight (BMI 18.5-24. 9)    Nutrition Diagnosis:   Increased nutrient needs related to acute injury/trauma as evidenced by wounds    Nutrition Interventions:   Food and/or Nutrient Delivery: Continue Current Diet  Nutrition Education/Counseling: No recommendation at this time  Coordination of Nutrition Care: Continue to monitor while inpatient  Plan of Care discussed with: Patient    Goals:     Goals: PO intake 75% or greater     Recent Labs     12/18/22 2025 12/19/22  0610    136   K 4.2 4.6    101   CO2 27 26   BUN 14 12   CREATININE 0.70 0.84   GLUCOSE 91 92   ALT 10  --    ALKPHOS 67  --       Lab Results   Component Value Date/Time    LABALBU 4.1 12/18/2022 08:25 PM         Nutrition Monitoring and Evaluation:   Behavioral-Environmental Outcomes: None Identified  Food/Nutrient Intake Outcomes: Diet Advancement/Tolerance  Physical Signs/Symptoms Outcomes: Biochemical Data, Skin, Weight    Discharge Planning:     Too soon to determine     [place], [address], [place]  Contact: [phone]

## 2022-12-21 VITALS
WEIGHT: 131.61 LBS | BODY MASS INDEX: 20.66 KG/M2 | HEART RATE: 63 BPM | SYSTOLIC BLOOD PRESSURE: 104 MMHG | OXYGEN SATURATION: 98 % | HEIGHT: 67 IN | TEMPERATURE: 98.4 F | DIASTOLIC BLOOD PRESSURE: 64 MMHG | RESPIRATION RATE: 16 BRPM

## 2022-12-21 LAB
ABSOLUTE EOS #: 0.13 K/UL (ref 0–0.44)
ABSOLUTE IMMATURE GRANULOCYTE: 0.03 K/UL (ref 0–0.3)
ABSOLUTE LYMPH #: 2.23 K/UL (ref 1.2–5.2)
ABSOLUTE MONO #: 1.22 K/UL (ref 0.1–1.4)
ANION GAP SERPL CALCULATED.3IONS-SCNC: 7 MMOL/L (ref 9–17)
BASOPHILS # BLD: 0 % (ref 0–2)
BASOPHILS ABSOLUTE: 0.03 K/UL (ref 0–0.2)
BUN BLDV-MCNC: 15 MG/DL (ref 6–20)
BUN/CREAT BLD: 19 (ref 9–20)
CALCIUM SERPL-MCNC: 9.5 MG/DL (ref 8.6–10.4)
CHLORIDE BLD-SCNC: 102 MMOL/L (ref 98–107)
CO2: 29 MMOL/L (ref 20–31)
CREAT SERPL-MCNC: 0.79 MG/DL (ref 0.5–0.9)
EOSINOPHILS RELATIVE PERCENT: 1 % (ref 1–4)
GFR SERPL CREATININE-BSD FRML MDRD: >60 ML/MIN/1.73M2
GLUCOSE BLD-MCNC: 95 MG/DL (ref 70–99)
HCT VFR BLD CALC: 30.9 % (ref 36.3–47.1)
HEMOGLOBIN: 10.3 G/DL (ref 11.9–15.1)
IMMATURE GRANULOCYTES: 0 %
LYMPHOCYTES # BLD: 19 % (ref 25–45)
MCH RBC QN AUTO: 33 PG (ref 25.2–33.5)
MCHC RBC AUTO-ENTMCNC: 33.3 G/DL (ref 28.4–34.8)
MCV RBC AUTO: 99 FL (ref 82.6–102.9)
MONOCYTES # BLD: 10 % (ref 2–8)
NRBC AUTOMATED: 0 PER 100 WBC
PDW BLD-RTO: 11.7 % (ref 11.8–14.4)
PLATELET # BLD: 232 K/UL (ref 138–453)
PMV BLD AUTO: 9.4 FL (ref 8.1–13.5)
POTASSIUM SERPL-SCNC: 4.3 MMOL/L (ref 3.7–5.3)
RBC # BLD: 3.12 M/UL (ref 3.95–5.11)
SEG NEUTROPHILS: 70 % (ref 34–64)
SEGMENTED NEUTROPHILS ABSOLUTE COUNT: 8.39 K/UL (ref 1.8–8)
SODIUM BLD-SCNC: 138 MMOL/L (ref 135–144)
SURGICAL PATHOLOGY REPORT: NORMAL
WBC # BLD: 12 K/UL (ref 4.5–13.5)

## 2022-12-21 PROCEDURE — 94761 N-INVAS EAR/PLS OXIMETRY MLT: CPT

## 2022-12-21 PROCEDURE — 85025 COMPLETE CBC W/AUTO DIFF WBC: CPT

## 2022-12-21 PROCEDURE — 6370000000 HC RX 637 (ALT 250 FOR IP): Performed by: SURGERY

## 2022-12-21 PROCEDURE — 6360000002 HC RX W HCPCS: Performed by: NURSE PRACTITIONER

## 2022-12-21 PROCEDURE — 36415 COLL VENOUS BLD VENIPUNCTURE: CPT

## 2022-12-21 PROCEDURE — 6360000002 HC RX W HCPCS: Performed by: SURGERY

## 2022-12-21 PROCEDURE — 2580000003 HC RX 258: Performed by: SURGERY

## 2022-12-21 PROCEDURE — 80048 BASIC METABOLIC PNL TOTAL CA: CPT

## 2022-12-21 RX ORDER — OXYCODONE HYDROCHLORIDE AND ACETAMINOPHEN 5; 325 MG/1; MG/1
1 TABLET ORAL EVERY 4 HOURS PRN
Qty: 20 TABLET | Refills: 0 | Status: SHIPPED | OUTPATIENT
Start: 2022-12-21 | End: 2022-12-26

## 2022-12-21 RX ORDER — ENOXAPARIN SODIUM 100 MG/ML
40 INJECTION SUBCUTANEOUS DAILY
Status: DISCONTINUED | OUTPATIENT
Start: 2022-12-22 | End: 2022-12-21 | Stop reason: HOSPADM

## 2022-12-21 RX ORDER — ONDANSETRON 4 MG/1
4 TABLET, FILM COATED ORAL 3 TIMES DAILY PRN
Qty: 15 TABLET | Refills: 0 | Status: SHIPPED | OUTPATIENT
Start: 2022-12-21

## 2022-12-21 RX ORDER — AMOXICILLIN AND CLAVULANATE POTASSIUM 875; 125 MG/1; MG/1
1 TABLET, FILM COATED ORAL 2 TIMES DAILY
Qty: 20 TABLET | Refills: 0 | Status: SHIPPED | OUTPATIENT
Start: 2022-12-21 | End: 2022-12-31

## 2022-12-21 RX ADMIN — MORPHINE SULFATE 2 MG: 2 INJECTION, SOLUTION INTRAMUSCULAR; INTRAVENOUS at 02:42

## 2022-12-21 RX ADMIN — OXYCODONE HYDROCHLORIDE AND ACETAMINOPHEN 1 TABLET: 5; 325 TABLET ORAL at 06:59

## 2022-12-21 RX ADMIN — SODIUM CHLORIDE, POTASSIUM CHLORIDE, SODIUM LACTATE AND CALCIUM CHLORIDE: 600; 310; 30; 20 INJECTION, SOLUTION INTRAVENOUS at 11:00

## 2022-12-21 RX ADMIN — ENOXAPARIN SODIUM 30 MG: 100 INJECTION SUBCUTANEOUS at 08:09

## 2022-12-21 RX ADMIN — PIPERACILLIN AND TAZOBACTAM 3375 MG: 3; .375 INJECTION, POWDER, FOR SOLUTION INTRAVENOUS at 02:45

## 2022-12-21 RX ADMIN — PIPERACILLIN AND TAZOBACTAM 3375 MG: 3; .375 INJECTION, POWDER, FOR SOLUTION INTRAVENOUS at 11:01

## 2022-12-21 ASSESSMENT — PAIN DESCRIPTION - LOCATION
LOCATION: ABDOMEN
LOCATION: ABDOMEN

## 2022-12-21 ASSESSMENT — PAIN DESCRIPTION - DESCRIPTORS
DESCRIPTORS: CRAMPING
DESCRIPTORS: ACHING;DISCOMFORT

## 2022-12-21 ASSESSMENT — PAIN DESCRIPTION - ORIENTATION
ORIENTATION: RIGHT
ORIENTATION: RIGHT;LEFT

## 2022-12-21 ASSESSMENT — PAIN SCALES - GENERAL
PAINLEVEL_OUTOF10: 4
PAINLEVEL_OUTOF10: 6
PAINLEVEL_OUTOF10: 5
PAINLEVEL_OUTOF10: 2

## 2022-12-21 ASSESSMENT — PAIN DESCRIPTION - ONSET: ONSET: GRADUAL

## 2022-12-21 ASSESSMENT — PAIN - FUNCTIONAL ASSESSMENT
PAIN_FUNCTIONAL_ASSESSMENT: ACTIVITIES ARE NOT PREVENTED
PAIN_FUNCTIONAL_ASSESSMENT: ACTIVITIES ARE NOT PREVENTED

## 2022-12-21 ASSESSMENT — PAIN SCALES - WONG BAKER: WONGBAKER_NUMERICALRESPONSE: 0

## 2022-12-21 ASSESSMENT — PAIN DESCRIPTION - FREQUENCY: FREQUENCY: INTERMITTENT

## 2022-12-21 ASSESSMENT — PAIN DESCRIPTION - PAIN TYPE: TYPE: SURGICAL PAIN

## 2022-12-21 NOTE — PROGRESS NOTES
Shift assessment and vitals obtained at this time as charted. Vitals WNL, patient is complaining of 4 out of 10 pain in her abdomen so PRN percocet was given. Patient is alert and oriented x4, assessment otherwise obtained as charted. Abdomen is soft, flat and tender, patient is passing gas. Dressings in place to abdominal incisions x4 are dry and intact. Patient is resting in the bed, denies any needs at this time. Care ongoing.

## 2022-12-21 NOTE — PROGRESS NOTES
Writer at bedside to complete second shift assessment. Assessment and vital signs completed, see flow sheet for details. Pt denies any needs at this time and is resting comfortably in bed. Call light within reach. Will continue to monitor.

## 2022-12-21 NOTE — DISCHARGE SUMMARY
Discharge Summary    Thad Green  :  2000  MRN:  [id number]    Admit date:  2022      Discharge date: 2022     Admitting Physician:  Jaimie Verma MD    Discharge Diagnoses:    Principal Problem:    Acute appendicitis  Resolved Problems:    * No resolved hospital problems. *      Hospital Course:   Thad Green is a 23 y.o. female admitted with acute appendicitis. Patient presented to the abdominal pain with complaints of lower abdominal pain. Onset 24 to 48 hours. Patient stated he essentially ended up in her right lower quadrant. She denied vomiting but complained of nausea. She denied fever. She denied any constipation. She denied dysuria hematuria. Patient was evaluated in the ER found to have acute appendicitis. Patient was admitted and taken to surgery with a laparoscopic appendectomy however ended up being an open appendectomy and there was a pus pocket. Patient tolerated procedure well. She was treated with IV Zosyn and IV fluids and labs were trended. Hemodynamically patient is stable at this time. She is afebrile. She is passing gas and tolerating diet. Patient will be discharged today we will place her on Augmentin for 10 days. She will also have lab work on  and she will follow-up with general surgery as an outpatient. She is agreeable and will be discharged today. Consultants:   Dr. Ileana Wilde, general surgery    Procedures:  Attempted laparoscopic appendectomy completed an open appendectomy    Complications: none    Discharge Condition: fair    Exam:  GEN:    Awake, alert and oriented x3. EYES:   EOMI, pupils equal   NECK: Supple. No lymphadenopathy. No carotid bruit  CVS:     regular rate and rhythm, no audible murmur  PULM:  CTA, no wheezes, rales or rhonchi, no acute respiratory distress  ABD:     Bowels sounds active. Abdomen is soft. No distention. Surgical site  tenderness to palpation. EXT:     no edema bilaterally .   No calf tenderness. NEURO: Moves all extremities. Motor and sensory are grossly intact  SKIN:    No rashes. No skin lesions. Significant Diagnostic Studies:   Lab Results   Component Value Date    WBC 12.0 12/21/2022    HGB 10.3 (L) 12/21/2022     12/21/2022       Lab Results   Component Value Date    BUN 15 12/21/2022    CREATININE 0.79 12/21/2022     12/21/2022    K 4.3 12/21/2022    CALCIUM 9.5 12/21/2022     12/21/2022    CO2 29 12/21/2022    LABGLOM >60 12/21/2022       Lab Results   Component Value Date    WBCUA 2 TO 5 12/18/2022    WBCUA None 12/18/2022    RBCUA None 12/18/2022    RBCUA None 12/18/2022    EPITHUA 5 TO 10 12/18/2022    EPITHUA 0 TO 2 12/18/2022    LEUKOCYTESUR NEGATIVE 12/18/2022    LEUKOCYTESUR NEGATIVE 12/18/2022    SPECGRAV >1.030 (H) 12/18/2022    SPECGRAV >1.030 (H) 12/18/2022    GLUCOSEU NEGATIVE 12/18/2022    GLUCOSEU NEGATIVE 12/18/2022    KETUA NEGATIVE 12/18/2022    KETUA NEGATIVE 12/18/2022    PROTEINU TRACE (A) 12/18/2022    PROTEINU NEGATIVE 12/18/2022    HGBUR NEGATIVE 12/18/2022    HGBUR NEGATIVE 12/18/2022       CT ABDOMEN PELVIS W IV CONTRAST Additional Contrast? None    Result Date: 12/18/2022  EXAMINATION: CT OF THE ABDOMEN AND PELVIS WITH CONTRAST 12/18/2022 8:24 pm TECHNIQUE: CT of the abdomen and pelvis was performed with the administration of intravenous contrast. Multiplanar reformatted images are provided for review. Automated exposure control, iterative reconstruction, and/or weight based adjustment of the mA/kV was utilized to reduce the radiation dose to as low as reasonably achievable. COMPARISON: None. HISTORY: ORDERING SYSTEM PROVIDED HISTORY: rlq abd pain TECHNOLOGIST PROVIDED HISTORY: rlq abd pain Decision Support Exception - unselect if not a suspected or confirmed emergency medical condition->Emergency Medical Condition (MA) FINDINGS: Lower Chest: The lung bases are clear and the heart size is normal. Organs:  There are no calcified gallstones. No pericholecystic fluid or fat stranding. The liver, spleen, pancreas, adrenal glands and kidneys are normal. GI/Bowel: The appendix is dilated and fluid-filled with 2 small appendicoliths. There is phlegmonous type extensive surrounding edema with fat stranding and thickening of tissue planes. There is no drainable fluid collection/abscess. There is a moderate stool load. No bowel obstruction. Pelvis: The uterus and urinary bladder are grossly normal. Peritoneum/Retroperitoneum: There is a small amount of free fluid in the pelvis. No free air. Bones/Soft Tissues: No acute bone or soft tissue abnormality. Acute appendicitis with adjacent phlegmonous type inflammatory changes in the right lower quadrant. There is no drainable fluid collection/abscess. Small volume of free fluid in the pelvis, likely reactive. Critical results were called by Dr. Jack Will. Brett Jordan MD to Ellen Juarez on 12/18/2022 at 21:05. Assessment and Plan:  Patient Active Problem List    Diagnosis Date Noted    Acute appendicitis 12/18/2022        Discharge Medications:         Medication List        START taking these medications      amoxicillin-clavulanate 875-125 MG per tablet  Commonly known as: AUGMENTIN  Take 1 tablet by mouth 2 times daily for 10 days     ondansetron 4 MG tablet  Commonly known as: ZOFRAN  Take 1 tablet by mouth 3 times daily as needed for Nausea or Vomiting     oxyCODONE-acetaminophen 5-325 MG per tablet  Commonly known as: PERCOCET  Take 1 tablet by mouth every 4 hours as needed for Pain for up to 5 days. Max Daily Amount: 6 tablets               Where to Get Your Medications        You can get these medications from any pharmacy    Bring a paper prescription for each of these medications  amoxicillin-clavulanate 875-125 MG per tablet  ondansetron 4 MG tablet  oxyCODONE-acetaminophen 5-325 MG per tablet         Patient Instructions:    Activity: activity as tolerated  Diet: regular diet  Wound Care: none needed  Other: CBC with differential and BMP on December 26    Disposition:   Discharge to Home    Follow up:  Patient will be followed by No primary care provider on file.  in 1-2 weeks; Dr. Eden Etienne in 1 week    CORE MEASURES on Discharge (if applicable)  ACE/ARB in CHF: NA  Statin in MI: NA  ASA in MI: NA  Statin in CVA: NA  Antiplatelet in CVA: NA    Total time spent on discharge services: 40 minutes    Including the following activities:  Evaluation and Management of patient  Discussion with patient and/or surrogate about current care plan  Coordination with Case Management and/or   Coordination of care with Consultants (if applicable)   Coordination of care with Receiving Facility Physician (if applicable)  Completion of DME forms (if applicable)  Preparation of Discharge Summary  Preparation of Medication Reconciliation  Preparation of Discharge Prescriptions    Signed:  MARK Grover CNP, MARK, NP-C  12/21/2022, 12:32 PM

## 2022-12-21 NOTE — PROGRESS NOTES
Pharmacist Review and Automatic Dose Adjustment of prophylactic enoxaparin      The reviewing pharmacist has made an adjustment to the ordered enoxaparin dose or converted to UFH per the approved [address] [place] [address] protocol and table as identified below. Faviola Garcia is a 23 y.o. female. Recent Labs     12/19/22  0610 12/20/22  0610 12/21/22  0605   CREATININE 0.84 0.90 0.79       Estimated Creatinine Clearance: 107 mL/min (based on SCr of 0.79 mg/dL). Height:   Ht Readings from Last 1 Encounters:   12/20/22 5' 7\" (1.702 m)     Weight:  Wt Readings from Last 1 Encounters:   12/21/22 131 lb 9.8 oz (59.7 kg)         Enoxaparin Indication: DVT/PE prophylaxis          Plan: Based upon renal function and patients weight, the enoxaparin dose has been changed/converted;     Lovenox 30 mg daily increased to Lovenox 40 mg daily    Thank you,  Kemal Harry, PharmD, 12/21/2022 8:58 AM

## 2022-12-21 NOTE — PROGRESS NOTES
Progress Note    SUBJECTIVE:    Patient seen for f/u of Acute appendicitis. She resting in bed alert and no distress. Passing flatus. No n/v. Tolerating activity well      ROS:   Constitutional: negative  for fevers, and negative for chills. Respiratory: negative for shortness of breath, negative for cough, and negative for wheezing  Cardiovascular: negative for chest pain, and negative for palpitations  Gastrointestinal: positive for abdominal pain, negative for nausea,negative for vomiting, negative for diarrhea, and negative for constipation     All other systems were reviewed with the patient and are negative unless otherwise stated in HPI      OBJECTIVE:      Vitals:   Vitals:    12/21/22 0649   BP: 104/64   Pulse: 63   Resp: 16   Temp: 98.4 °F (36.9 °C)   SpO2: 98%     Weight: 131 lb 9.8 oz (59.7 kg)   Height: 5' 7\" (170.2 cm)     Weight  Wt Readings from Last 3 Encounters:   12/21/22 131 lb 9.8 oz (59.7 kg)     Body mass index is 20.61 kg/m². 24HR INTAKE/OUTPUT:      Intake/Output Summary (Last 24 hours) at 12/21/2022 0729  Last data filed at 12/21/2022 0655  Gross per 24 hour   Intake 2065.14 ml   Output --   Net 2065.14 ml     -----------------------------------------------------------------  Exam:    GEN:    Awake, alert and oriented x3. EYES:  EOMI, pupils equal   NECK: Supple. No lymphadenopathy. No carotid bruit  CVS:    regular rate and rhythm, no audible murmur  PULM:  CTA, no wheezes, rales or rhonchi, no acute respiratory distress  ABD:    Bowels sounds active. Abdomen is soft. No distention. Surgical site  tenderness to palpation. EXT:   no edema bilaterally . No calf tenderness. NEURO: Moves all extremities. Motor and sensory are grossly intact  SKIN:  No rashes.   No skin lesions.    -----------------------------------------------------------------    Diagnostic Data:      Complete Blood Count:   Recent Labs     12/19/22 0610 12/20/22 0610 12/21/22  0605   WBC 19.3* 24.2* collection/abscess. Small volume of free fluid in the pelvis, likely reactive. Critical results were called by Dr. Yang Monahan. Mariusz Deras MD to [facility] on   12/18/2022 at 21:05. ASSESSMENT / PLAN:  Acute appendicitis  Continue current therapy   Appreciate Dr. Marshall Ahr diet   Ambulate  Nutrition status:    Well developed, well nourished with no malnutrition  Dietician consult initiated  Hospital Prophylaxis:   DVT: Lovenox   Stress Ulcer:  none    High risk medications: none   Disposition:    Discharge plan is home today      MARK Bar - CNP , MARK, NP-C  Hospitalist Medicine        12/21/2022, 7:29 AM

## 2022-12-21 NOTE — DISCHARGE INSTRUCTIONS
Patient Instructions:    Activity: activity as tolerated  Diet: regular diet  Wound Care: none needed  Other: CBC with differential and BMP on December 26     Follow up:  Patient will be followed by Dr. Craig Horn in 1 week

## 2022-12-21 NOTE — DISCHARGE INSTR - DIET
Good nutrition is important when healing from an illness, injury, or surgery. Follow any nutrition recommendations given to you during your hospital stay. If you were given an oral nutrition supplement while in the hospital, continue to take this supplement at home. You can take it with meals, in-between meals, and/or before bedtime. These supplements can be purchased at most local grocery stores, pharmacies, and chain [employer]-stores. If you have any questions about your diet or nutrition, call the hospital and ask for the dietitian. Regular. Advance as tolerated.

## 2022-12-21 NOTE — PLAN OF CARE
Problem: Discharge Planning  Goal: Discharge to home or other facility with appropriate resources  Outcome: Progressing  Flowsheets (Taken 12/20/2022 2235)  Discharge to home or other facility with appropriate resources:   Identify barriers to discharge with patient and caregiver   Arrange for needed discharge resources and transportation as appropriate     Problem: Pain  Goal: Verbalizes/displays adequate comfort level or baseline comfort level  Outcome: Progressing  Flowsheets (Taken 12/20/2022 2235)  Verbalizes/displays adequate comfort level or baseline comfort level:   Encourage patient to monitor pain and request assistance   Assess pain using appropriate pain scale   Administer analgesics based on type and severity of pain and evaluate response   Implement non-pharmacological measures as appropriate and evaluate response     Problem: Chronic Conditions and Co-morbidities  Goal: Patient's chronic conditions and co-morbidity symptoms are monitored and maintained or improved  Outcome: Progressing  Flowsheets (Taken 12/20/2022 2235)  Care Plan - Patient's Chronic Conditions and Co-Morbidity Symptoms are Monitored and Maintained or Improved: Monitor and assess patient's chronic conditions and comorbid symptoms for stability, deterioration, or improvement     Problem: ABCDS Injury Assessment  Goal: Absence of physical injury  Outcome: Progressing  Flowsheets (Taken 12/20/2022 2235)  Absence of Physical Injury: Implement safety measures based on patient assessment

## 2022-12-26 ENCOUNTER — HOSPITAL ENCOUNTER (OUTPATIENT)
Age: 22
Discharge: HOME | End: 2022-12-26
Payer: COMMERCIAL

## 2022-12-26 DIAGNOSIS — K35.20 ACUTE APPENDICITIS WITH GENERALIZED PERITONITIS, UNSPECIFIED WHETHER ABSCESS PRESENT, UNSPECIFIED WHETHER GANGRENE PRESENT, UNSPECIFIED WHETHER PERFORATION PRESENT: ICD-10-CM

## 2022-12-26 DIAGNOSIS — K35.80 ACUTE APPENDICITIS, UNSPECIFIED ACUTE APPENDICITIS TYPE: ICD-10-CM

## 2022-12-26 LAB
ABSOLUTE EOS #: 0.18 K/UL (ref 0–0.44)
ABSOLUTE IMMATURE GRANULOCYTE: 0.05 K/UL (ref 0–0.3)
ABSOLUTE LYMPH #: 1.76 K/UL (ref 1.2–5.2)
ABSOLUTE MONO #: 0.66 K/UL (ref 0.1–1.4)
ANION GAP SERPL CALCULATED.3IONS-SCNC: 10 MMOL/L (ref 9–17)
BASOPHILS # BLD: 0 % (ref 0–2)
BASOPHILS ABSOLUTE: 0.03 K/UL (ref 0–0.2)
BUN BLDV-MCNC: 17 MG/DL (ref 6–20)
BUN/CREAT BLD: 25 (ref 9–20)
CALCIUM SERPL-MCNC: 10.1 MG/DL (ref 8.6–10.4)
CHLORIDE BLD-SCNC: 101 MMOL/L (ref 98–107)
CO2: 28 MMOL/L (ref 20–31)
CREAT SERPL-MCNC: 0.68 MG/DL (ref 0.5–0.9)
EOSINOPHILS RELATIVE PERCENT: 2 % (ref 1–4)
GFR SERPL CREATININE-BSD FRML MDRD: >60 ML/MIN/1.73M2
GLUCOSE BLD-MCNC: 80 MG/DL (ref 70–99)
HCT VFR BLD CALC: 36.6 % (ref 36.3–47.1)
HEMOGLOBIN: 12.2 G/DL (ref 11.9–15.1)
IMMATURE GRANULOCYTES: 1 %
LYMPHOCYTES # BLD: 23 % (ref 25–45)
MCH RBC QN AUTO: 32.5 PG (ref 25.2–33.5)
MCHC RBC AUTO-ENTMCNC: 33.3 G/DL (ref 28.4–34.8)
MCV RBC AUTO: 97.6 FL (ref 82.6–102.9)
MONOCYTES # BLD: 9 % (ref 2–8)
NRBC AUTOMATED: 0 PER 100 WBC
PDW BLD-RTO: 11.2 % (ref 11.8–14.4)
PLATELET # BLD: 354 K/UL (ref 138–453)
PMV BLD AUTO: 8.9 FL (ref 8.1–13.5)
POTASSIUM SERPL-SCNC: 4.5 MMOL/L (ref 3.7–5.3)
RBC # BLD: 3.75 M/UL (ref 3.95–5.11)
SEG NEUTROPHILS: 65 % (ref 34–64)
SEGMENTED NEUTROPHILS ABSOLUTE COUNT: 5.12 K/UL (ref 1.8–8)
SODIUM BLD-SCNC: 139 MMOL/L (ref 135–144)
WBC # BLD: 7.8 K/UL (ref 4.5–13.5)

## 2022-12-26 PROCEDURE — 85025 COMPLETE CBC W/AUTO DIFF WBC: CPT

## 2022-12-26 PROCEDURE — 36415 COLL VENOUS BLD VENIPUNCTURE: CPT

## 2022-12-26 PROCEDURE — 80048 BASIC METABOLIC PNL TOTAL CA: CPT

## 2022-12-27 PROBLEM — K35.211 ACUTE APPENDICITIS WITH PERFORATION, GENERALIZED PERITONITIS, AND ABSCESS, WITHOUT GANGRENE: Status: ACTIVE | Noted: 2022-12-18

## 2022-12-27 PROBLEM — K35.32 ACUTE APPENDICITIS WITH PERFORATION AND LOCALIZED PERITONITIS: Status: ACTIVE | Noted: 2022-12-18

## 2022-12-27 PROBLEM — K35.21 ACUTE APPENDICITIS WITH PERFORATION, GENERALIZED PERITONITIS, AND ABSCESS, WITHOUT GANGRENE: Status: ACTIVE | Noted: 2022-12-18

## 2024-02-11 ENCOUNTER — TELEPHONE (OUTPATIENT)
Dept: OBGYN | Age: 24
End: 2024-02-11

## 2024-02-11 NOTE — TELEPHONE ENCOUNTER
Patient called answering service confused as received two phone calls yesterday - she thought from hospital but was unable to answer or return the call.  So today she called to follow up on these calls and confirm appt or Tuesday  I did let her know that I do not see a gyn appt for Tuesday in [place] like she believed was scheduled  I encouraged her to call [facility] OB/Gyn office on Monday during normal business hours to inquire.   She denied all concerns related to gyn todayon the phone.

## 2024-02-12 NOTE — TELEPHONE ENCOUNTER
Tried to call the number on the chart and call will not go through.  She has not been seen by the [facility] from what I see after looking in the appointment desk and nothing scheduled.

## 2024-02-12 NOTE — TELEPHONE ENCOUNTER
Okay thanks - the whole thing yesterday was very odd and confusing but she was adamant appt with you guys this week which I told her nothing on the books.  Guess we will see if she calls.  Thank you again

## 2024-02-13 ENCOUNTER — OFFICE VISIT (OUTPATIENT)
Dept: OBGYN | Age: 24
End: 2024-02-13
Payer: COMMERCIAL

## 2024-02-13 VITALS
DIASTOLIC BLOOD PRESSURE: 60 MMHG | SYSTOLIC BLOOD PRESSURE: 110 MMHG | BODY MASS INDEX: 19.55 KG/M2 | WEIGHT: 129 LBS | HEIGHT: 68 IN

## 2024-02-13 DIAGNOSIS — L70.0 ACNE VULGARIS: Primary | ICD-10-CM

## 2024-02-13 PROCEDURE — 99203 OFFICE O/P NEW LOW 30 MIN: CPT | Performed by: ADVANCED PRACTICE MIDWIFE

## 2024-02-13 RX ORDER — DROSPIRENONE AND ETHINYL ESTRADIOL 0.02-3(28)
1 KIT ORAL DAILY
Qty: 3 PACKET | Refills: 1 | Status: SHIPPED | OUTPATIENT
Start: 2024-02-13

## 2024-02-13 NOTE — PROGRESS NOTES
PROBLEM VISIT     Date of service: 2024    Kristina Rudolph  Is a 23 y.o. single, female    PT's PCP is: No primary care provider on file.     : 2000                                             Subjective:       Patient's last menstrual period was 2024 (exact date).     OB History    Para Term  AB Living   0 0 0 0 0 0   SAB IAB Ectopic Molar Multiple Live Births   0 0 0 0 0 0        Social History     Tobacco Use   Smoking Status Never   Smokeless Tobacco Never        Social History     Substance and Sexual Activity   Alcohol Use Not Currently    Comment: very rarely       Social History     Substance and Sexual Activity   Sexual Activity Yes    Partners: Male    Comment: pill       Allergies: Patient has no known allergies.    Chief Complaint   Patient presents with    Acne     New Patient, pt is from [facility] and was on a pill form [employer] 3mg, pt has been on this for about 2 months now but is about to run out and needs it refilled. Pt declines std testing       Last Yearly date:  never     Last pap date and results: never     Last HPV date and results: n/a    PE:  Vital Signs  Blood pressure 110/60, height 1.727 m (5' 8\"), weight 58.5 kg (129 lb), last menstrual period 2024, not currently breastfeeding.  Estimated body mass index is 19.61 kg/m² as calculated from the following:    Height as of this encounter: 1.727 m (5' 8\").    Weight as of this encounter: 58.5 kg (129 lb).    PHQ-9 Total Score: 0 (2024  1:47 PM)        NURSE: JEFRY    HPI: PT here today for OCPs for acne - pt states she got her medication from her home country but now she is out. Pt states the acne is a little better but she wishes it would be more. PT has seen a dermatologist and is using the face washes.     Yes  PT denies fever, chills, nausea and vomiting       Objective: facial acne around chin noted.    Results reviewed today:    No results found for this visit on 24.

## 2024-05-06 ENCOUNTER — OFFICE VISIT (OUTPATIENT)
Age: 24
End: 2024-05-06

## 2024-05-06 VITALS
OXYGEN SATURATION: 98 % | TEMPERATURE: 98.1 F | BODY MASS INDEX: 19.16 KG/M2 | DIASTOLIC BLOOD PRESSURE: 73 MMHG | WEIGHT: 126 LBS | SYSTOLIC BLOOD PRESSURE: 113 MMHG | HEART RATE: 69 BPM

## 2024-05-06 DIAGNOSIS — Z86.2 HISTORY OF ANEMIA: Primary | ICD-10-CM

## 2024-05-06 RX ORDER — LANOLIN ALCOHOL/MO/W.PET/CERES
400 CREAM (GRAM) TOPICAL DAILY
COMMUNITY

## 2024-05-06 ASSESSMENT — ENCOUNTER SYMPTOMS
WHEEZING: 0
COUGH: 0
GASTROINTESTINAL NEGATIVE: 1
SHORTNESS OF BREATH: 0
CHEST TIGHTNESS: 0

## 2024-05-06 NOTE — PROGRESS NOTES
[facility] PHYSICIANS [facility], [facility]  [address] GUPTA [address] [id number]  Dept: [phone]  Dept Fax: [phone]    Kristina Rudolph is a 23 y.o. femalewho presents to the [facility] today for c/o of Other (Requesting testing for iron)      HPI:     23-year-old female presents to [facility] today requesting testing for her iron level.  She states in the past she had low iron and would like her iron tested at this time.  She denies dizziness, shortness of breath or fatigue.  She states during track season she did feel a little fatigued however she is not fatigued any longer.  She is taking any iron supplements and folic acid previously prescribed by her PCP.  She states her menses are normal.  She is currently taking over-the-counter contraceptive pill reporting her menses are normal with light flow.  Last normal menstrual period 5/4/2024 to        Past Medical History:   Diagnosis Date    Acne        Current Outpatient Medications   Medication Sig Dispense Refill    folic acid (FOLVITE) 400 MCG tablet Take 1 tablet by mouth daily      Ferrous Sulfate (IRON PO) Take by mouth      drospirenone-ethinyl estradiol (JENNIFER) 3-0.02 MG per tablet Take 1 tablet by mouth daily 3 packet 1    ondansetron (ZOFRAN) 4 MG tablet Take 1 tablet by mouth 3 times daily as needed for Nausea or Vomiting (Patient not taking: Reported on 5/6/2024) 15 tablet 0     No current facility-administered medications for this visit.     No Known Allergies    :     Review of Systems   Constitutional:  Negative for activity change, chills and fever.   Respiratory:  Negative for cough, chest tightness, shortness of breath and wheezing.    Cardiovascular:  Negative for chest pain.   Gastrointestinal: Negative.    Skin:  Negative for rash.   Neurological:  Negative for dizziness and headaches.       :     Physical Exam  Vitals and nursing

## 2024-05-07 ENCOUNTER — HOSPITAL ENCOUNTER (OUTPATIENT)
Age: 24
Discharge: HOME | End: 2024-05-07
Payer: COMMERCIAL

## 2024-05-07 DIAGNOSIS — Z86.2 HISTORY OF ANEMIA: ICD-10-CM

## 2024-05-07 LAB
HCT VFR BLD AUTO: 37.8 % (ref 36.3–47.1)
HGB BLD-MCNC: 12.7 G/DL (ref 11.9–15.1)

## 2024-05-07 PROCEDURE — 85018 HEMOGLOBIN: CPT

## 2024-05-07 PROCEDURE — 36415 COLL VENOUS BLD VENIPUNCTURE: CPT

## 2024-05-07 PROCEDURE — 85014 HEMATOCRIT: CPT

## 2024-05-08 ENCOUNTER — PATIENT MESSAGE (OUTPATIENT)
Age: 24
End: 2024-05-08

## 2024-05-08 NOTE — TELEPHONE ENCOUNTER
Patient contacted clinic to review labs.  Discussed with patient hemoglobin and hematocrit are within normal range.  Encourage patient to schedule an appointment with PCP if she would like additional testing completed or if she has any questions or concerns.  Patient will stop by [facility] [place] today to get a copy of her labs.

## 2024-05-13 ENCOUNTER — HOSPITAL ENCOUNTER (OUTPATIENT)
Age: 24
Setting detail: SPECIMEN
Discharge: HOME | End: 2024-05-13
Payer: COMMERCIAL

## 2024-05-13 ENCOUNTER — OFFICE VISIT (OUTPATIENT)
Dept: OBGYN | Age: 24
End: 2024-05-13
Payer: COMMERCIAL

## 2024-05-13 VITALS
SYSTOLIC BLOOD PRESSURE: 102 MMHG | BODY MASS INDEX: 18.49 KG/M2 | HEIGHT: 68 IN | WEIGHT: 122 LBS | DIASTOLIC BLOOD PRESSURE: 62 MMHG

## 2024-05-13 DIAGNOSIS — L70.0 ACNE VULGARIS: ICD-10-CM

## 2024-05-13 DIAGNOSIS — Z01.419 WELL WOMAN EXAM WITH ROUTINE GYNECOLOGICAL EXAM: ICD-10-CM

## 2024-05-13 DIAGNOSIS — Z01.419 WELL WOMAN EXAM WITH ROUTINE GYNECOLOGICAL EXAM: Primary | ICD-10-CM

## 2024-05-13 PROCEDURE — 99395 PREV VISIT EST AGE 18-39: CPT | Performed by: ADVANCED PRACTICE MIDWIFE

## 2024-05-13 PROCEDURE — G0145 SCR C/V CYTO,THINLAYER,RESCR: HCPCS

## 2024-05-13 RX ORDER — DROSPIRENONE AND ETHINYL ESTRADIOL 0.02-3(28)
1 KIT ORAL DAILY
Qty: 3 PACKET | Refills: 3 | Status: SHIPPED | OUTPATIENT
Start: 2024-05-13

## 2024-05-13 NOTE — PATIENT INSTRUCTIONS
SURVEY:    You may be receiving a survey regarding your visit today.    Please complete the survey to enable us to provide the highest quality of care to you and your family.    We strive for all 5's - thank you    If you cannot score us a very good (5) on any question, please call the office to discuss this with Janet (our ). We would like to discuss how we could of made your experience a very good one.    Janet: [phone]    Thank you.

## 2024-05-13 NOTE — PROGRESS NOTES
YEARLY PHYSICAL    Date of service: 2024    Kristina Rudolph  Is a 23 y.o.  single, female    PT's PCP is: No primary care provider on file.     : 2000                                             Subjective:       Patient's last menstrual period was 2024 (exact date).     Are your menses regular: yes    OB History    Para Term  AB Living   0 0 0 0 0 0   SAB IAB Ectopic Molar Multiple Live Births   0 0 0 0 0 0        Social History     Tobacco Use   Smoking Status Never   Smokeless Tobacco Never        Social History     Substance and Sexual Activity   Alcohol Use Not Currently    Comment: very rarely       Family History   Problem Relation Age of Onset    No Known Problems Mother     No Known Problems Father        Any family history of breast or ovarian cancer: No    Any family history of blood clots: No    Allergies: Patient has no known allergies.      Current Outpatient Medications:     folic acid (FOLVITE) 400 MCG tablet, Take 1 tablet by mouth daily, Disp: , Rfl:     Ferrous Sulfate (IRON PO), Take by mouth, Disp: , Rfl:     drospirenone-ethinyl estradiol (JENNIFER) 3-0.02 MG per tablet, Take 1 tablet by mouth daily, Disp: 3 packet, Rfl: 1    ondansetron (ZOFRAN) 4 MG tablet, Take 1 tablet by mouth 3 times daily as needed for Nausea or Vomiting (Patient not taking: Reported on 2024), Disp: 15 tablet, Rfl: 0    Social History     Substance and Sexual Activity   Sexual Activity Yes    Partners: Male    Comment: pill       Any bleeding or pain with intercourse: No     Last Yearly date:  never     Last pap date : No cervical cancer screening on file     results: never     Last HPV date and results: n/a    Has pt ever had an abnormal:No    IF yes result and date:     Mammogram Result (most recent):  No results found for this or any previous visit from the past 3650 days.        DEXA Result (most recent):  No results found

## 2024-05-18 LAB — CYTOLOGY REPORT: NORMAL

## 2025-01-27 ENCOUNTER — OFFICE VISIT (OUTPATIENT)
Age: 25
End: 2025-01-27

## 2025-01-27 VITALS
OXYGEN SATURATION: 100 % | DIASTOLIC BLOOD PRESSURE: 87 MMHG | SYSTOLIC BLOOD PRESSURE: 132 MMHG | TEMPERATURE: 99 F | HEART RATE: 77 BPM

## 2025-01-27 DIAGNOSIS — Z86.2 HISTORY OF ANEMIA: Primary | ICD-10-CM

## 2025-01-27 ASSESSMENT — ENCOUNTER SYMPTOMS
EYES NEGATIVE: 1
RESPIRATORY NEGATIVE: 1
GASTROINTESTINAL NEGATIVE: 1

## 2025-01-27 NOTE — PROGRESS NOTES
[facility] PHYSICIANS [facility]  [address]IL[address] GUPTA [address] [id number]  Dept: [phone]  Dept Fax: [phone]    Kristina Rudolph is a 24 y.o. femalewho presents to the [facility] today for c/o of Other (Requesting labs)      HPI:     24-year-old female presents to [facility] today requesting labs due to history of anemia.  She reports she just wants labs obtained due to history of anemia.  She was seen in the clinic May 2024.  Her hemoglobin 12.7, hematocrit 37.8 at that time.        Past Medical History:   Diagnosis Date    Acne        Current Outpatient Medications   Medication Sig Dispense Refill    drospirenone-ethinyl estradiol (JENNIFER) 3-0.02 MG per tablet Take 1 tablet by mouth daily (Patient not taking: Reported on 1/27/2025) 3 packet 3    folic acid (FOLVITE) 400 MCG tablet Take 1 tablet by mouth daily (Patient not taking: Reported on 1/27/2025)      Ferrous Sulfate (IRON PO) Take by mouth (Patient not taking: Reported on 1/27/2025)      ondansetron (ZOFRAN) 4 MG tablet Take 1 tablet by mouth 3 times daily as needed for Nausea or Vomiting (Patient not taking: Reported on 5/6/2024) 15 tablet 0     No current facility-administered medications for this visit.     No Known Allergies    :     Review of Systems   Constitutional: Negative.    HENT: Negative.     Eyes: Negative.    Respiratory: Negative.     Cardiovascular: Negative.    Gastrointestinal: Negative.    Genitourinary: Negative.    Musculoskeletal: Negative.    Skin: Negative.    Neurological: Negative.    Psychiatric/Behavioral: Negative.         :     Physical Exam  Vitals and nursing note reviewed.   Constitutional:       General: She is not in acute distress.     Appearance: She is well-developed. She is not ill-appearing or toxic-appearing.   HENT:      Head: Normocephalic and atraumatic.      Right Ear: External ear normal.      
08-May-2024 01:30

## 2025-01-28 ENCOUNTER — HOSPITAL ENCOUNTER (OUTPATIENT)
Age: 25
Discharge: HOME | End: 2025-01-28
Payer: COMMERCIAL

## 2025-01-28 DIAGNOSIS — Z86.2 HISTORY OF ANEMIA: ICD-10-CM

## 2025-01-28 LAB
BASOPHILS # BLD: 0.03 K/UL (ref 0–0.2)
BASOPHILS NFR BLD: 0 % (ref 0–2)
EOSINOPHIL # BLD: 0.07 K/UL (ref 0–0.44)
EOSINOPHILS RELATIVE PERCENT: 1 % (ref 1–4)
ERYTHROCYTE [DISTWIDTH] IN BLOOD BY AUTOMATED COUNT: 11.6 % (ref 11.8–14.4)
FERRITIN SERPL-MCNC: 82 NG/ML (ref 15–150)
HCT VFR BLD AUTO: 36.1 % (ref 36.3–47.1)
HGB BLD-MCNC: 12.4 G/DL (ref 11.9–15.1)
IMM GRANULOCYTES # BLD AUTO: <0.03 K/UL (ref 0–0.3)
IMM GRANULOCYTES NFR BLD: 0 %
IRON SATN MFR SERPL: 38 % (ref 20–55)
IRON SERPL-MCNC: 129 UG/DL (ref 37–145)
LYMPHOCYTES NFR BLD: 1.83 K/UL (ref 1.1–3.7)
LYMPHOCYTES RELATIVE PERCENT: 18 % (ref 24–43)
MCH RBC QN AUTO: 32.4 PG (ref 25.2–33.5)
MCHC RBC AUTO-ENTMCNC: 34.3 G/DL (ref 28.4–34.8)
MCV RBC AUTO: 94.3 FL (ref 82.6–102.9)
MONOCYTES NFR BLD: 0.52 K/UL (ref 0.1–1.2)
MONOCYTES NFR BLD: 5 % (ref 3–12)
NEUTROPHILS NFR BLD: 76 % (ref 36–65)
NEUTS SEG NFR BLD: 7.55 K/UL (ref 1.5–8.1)
NRBC BLD-RTO: 0 PER 100 WBC
PLATELET # BLD AUTO: 260 K/UL (ref 138–453)
PMV BLD AUTO: 9.3 FL (ref 8.1–13.5)
RBC # BLD AUTO: 3.83 M/UL (ref 3.95–5.11)
TIBC SERPL-MCNC: 336 UG/DL (ref 250–450)
UNSATURATED IRON BINDING CAPACITY: 207 UG/DL (ref 112–347)
WBC OTHER # BLD: 10 K/UL (ref 3.5–11.3)

## 2025-01-28 PROCEDURE — 82728 ASSAY OF FERRITIN: CPT

## 2025-01-28 PROCEDURE — 83540 ASSAY OF IRON: CPT

## 2025-01-28 PROCEDURE — 85025 COMPLETE CBC W/AUTO DIFF WBC: CPT

## 2025-01-28 PROCEDURE — 83550 IRON BINDING TEST: CPT

## 2025-01-28 PROCEDURE — 36415 COLL VENOUS BLD VENIPUNCTURE: CPT

## 2025-05-15 DIAGNOSIS — L70.0 ACNE VULGARIS: ICD-10-CM

## 2025-05-15 RX ORDER — DROSPIRENONE AND ETHINYL ESTRADIOL 0.02-3(28)
1 KIT ORAL DAILY
Qty: 3 PACKET | Refills: 0 | Status: SHIPPED | OUTPATIENT
Start: 2025-05-15

## 2025-06-24 ENCOUNTER — OFFICE VISIT (OUTPATIENT)
Dept: OBGYN | Age: 25
End: 2025-06-24
Payer: COMMERCIAL

## 2025-06-24 VITALS
SYSTOLIC BLOOD PRESSURE: 102 MMHG | DIASTOLIC BLOOD PRESSURE: 62 MMHG | BODY MASS INDEX: 19.26 KG/M2 | HEIGHT: 69 IN | WEIGHT: 130 LBS

## 2025-06-24 DIAGNOSIS — L70.0 ACNE VULGARIS: ICD-10-CM

## 2025-06-24 DIAGNOSIS — Z01.419 WELL WOMAN EXAM WITH ROUTINE GYNECOLOGICAL EXAM: Primary | ICD-10-CM

## 2025-06-24 PROCEDURE — 99395 PREV VISIT EST AGE 18-39: CPT | Performed by: ADVANCED PRACTICE MIDWIFE

## 2025-06-24 RX ORDER — DROSPIRENONE AND ETHINYL ESTRADIOL 0.02-3(28)
1 KIT ORAL DAILY
Qty: 3 PACKET | Refills: 3 | Status: SHIPPED | OUTPATIENT
Start: 2025-06-24

## 2025-06-24 SDOH — ECONOMIC STABILITY: FOOD INSECURITY: WITHIN THE PAST 12 MONTHS, YOU WORRIED THAT YOUR FOOD WOULD RUN OUT BEFORE YOU GOT MONEY TO BUY MORE.: NEVER TRUE

## 2025-06-24 SDOH — ECONOMIC STABILITY: FOOD INSECURITY: WITHIN THE PAST 12 MONTHS, THE FOOD YOU BOUGHT JUST DIDN'T LAST AND YOU DIDN'T HAVE MONEY TO GET MORE.: NEVER TRUE

## 2025-06-24 ASSESSMENT — PATIENT HEALTH QUESTIONNAIRE - PHQ9
SUM OF ALL RESPONSES TO PHQ QUESTIONS 1-9: 0
SUM OF ALL RESPONSES TO PHQ QUESTIONS 1-9: 0
2. FEELING DOWN, DEPRESSED OR HOPELESS: NOT AT ALL
1. LITTLE INTEREST OR PLEASURE IN DOING THINGS: NOT AT ALL
SUM OF ALL RESPONSES TO PHQ QUESTIONS 1-9: 0
SUM OF ALL RESPONSES TO PHQ QUESTIONS 1-9: 0

## 2025-06-24 NOTE — PATIENT INSTRUCTIONS
SURVEY:    You may be receiving a survey regarding your visit today.    Please complete the survey to enable us to provide the highest quality of care to you and your family.    We strive for all 5's - thank you    If you cannot score us a very good (5) on any question, please call the office to discuss this with Janet (our ). We would like to discuss how we could of made your experience a very good one.    Janet: [phone]    Thank you.

## 2025-06-24 NOTE — PROGRESS NOTES
Location normal, Lesions absent, Prolapse absent  Urethra:  Fullness absent, Masses absent  Bladder:  Fullness absent, Masses absent, Tenderness absent, Cystocele absent  Vagina:  General appearance normal, Estrogen effect normal, Discharge absent, Lesions absent, Pelvic support normal  Cervix:  General appearance normal, Lesions absent, Discharge absent, Tenderness absent, Enlargement absent, Nodularity absent  Uterus:  Size normal, Contour normal, Position normal, Masses absent  Adenexa:  Masses absent, Tenderness absent, Enlargement absent                                    Vaginal discharge: no vaginal discharge                              Assessment and Plan         Assessment & Plan  Well woman exam with routine gynecological exam   Chronic, at goal (stable), continue current treatment plan         Acne vulgaris   Chronic, at goal (stable), continue current treatment plan    Orders:    drospirenone-ethinyl estradiol (JENNIFER) 3-0.02 MG per tablet; Take 1 tablet by mouth daily            Return in about 1 year (around 6/24/2026) for yearly.    I am having Kristina Rudolph maintain her ondansetron, Ferrous Sulfate (IRON PO), folic acid, and drospirenone-ethinyl estradiol.    She was also counseled on her preventative health maintenance recommendations and follow-up.     Patient Instructions   SURVEY:    You may be receiving a survey regarding your visit today.    Please complete the survey to enable us to provide the highest quality of care to you and your family.    We strive for all 5's - thank you    If you cannot score us a very good (5) on any question, please call the office to discuss this with Janet (our ). We would like to discuss how we could of made your experience a very good one.    Janet: [phone]    Thank you.     MARK Marvin CNM,6/24/2025 1:17 PM

## (undated) DEVICE — Device

## (undated) DEVICE — STAPLER INT L340MM 45MM STD 12 FIRING B FRM PWR + GRIPPING

## (undated) DEVICE — SHEET,DRAPE,53X77,STERILE: Brand: MEDLINE

## (undated) DEVICE — BAG SPEC REM 224ML W4XL6IN DIA10MM 1 HND GYN DISP ENDOPCH

## (undated) DEVICE — PACK LAP BASIC

## (undated) DEVICE — YANKAUER,FLEXIBLE HANDLE,REGLR CAPACITY: Brand: MEDLINE INDUSTRIES, INC.

## (undated) DEVICE — TROCARS: Brand: KII® BALLOON BLUNT TIP SYSTEM

## (undated) DEVICE — CLICKLINE SCISSORS INSERT: Brand: CLICKLINE

## (undated) DEVICE — SUTURE VCRL SZ 2-0 L27IN ABSRB UD L26MM SH 1/2 CIR J417H

## (undated) DEVICE — TROCAR: Brand: KII FIOS FIRST ENTRY

## (undated) DEVICE — MASTISOL ADHESIVE LIQ 2/3ML

## (undated) DEVICE — STRIP,CLOSURE,WOUND,MEDI-STRIP,1/4X3: Brand: MEDLINE

## (undated) DEVICE — STRIP,CLOSURE,WOUND,MEDI-STRIP,1/2X4: Brand: MEDLINE

## (undated) DEVICE — SPONGE LAP W18XL18IN WHT COT 4 PLY FLD STRUNG RADPQ DISP ST

## (undated) DEVICE — DRESSING SURESITE-123PLUSPAD 2.4 IN X2.8 IN

## (undated) DEVICE — SUTURE VCRL + SZ 3-0 L27IN ABSRB UD L26MM SH 1/2 CIR VCP416H

## (undated) DEVICE — SUTURE SZ 0 27IN 5/8 CIR UR-6  TAPER PT VIOLET ABSRB VICRYL J603H

## (undated) DEVICE — TOWEL,OR,DSP,ST,NATURAL,DLX,4/PK,20PK/CS: Brand: MEDLINE

## (undated) DEVICE — SYRINGE IRRIG 60ML SFT PLIABLE BLB EZ TO GRP 1 HND USE W/

## (undated) DEVICE — SUTURE PERMA-HAND SZ 2-0 L30IN NONABSORBABLE BLK L26MM SH K833H

## (undated) DEVICE — PENCIL ES ULT VAC W TELSCP NOSE EZ CLN BLDE 10FT

## (undated) DEVICE — INTENDED FOR TISSUE SEPARATION, AND OTHER PROCEDURES THAT REQUIRE A SHARP SURGICAL BLADE TO PUNCTURE OR CUT.: Brand: BARD-PARKER ® CARBON RIB-BACK BLADES

## (undated) DEVICE — DRESSING TRNSPAR W4XL4.8IN W/ N ADH PD SURESITE-123 PLUSPD

## (undated) DEVICE — YANKAUER,POOLE TIP,STERILE,50/CS: Brand: MEDLINE

## (undated) DEVICE — SUTURE MCRYL + SZ 4-0 L27IN ABSRB UD L19MM PS-2 3/8 CIR MCP426H

## (undated) DEVICE — UNDERGLOVE SURG SZ 7 BLU LTX FREE SYN POLYISOPRENE POLYMER